# Patient Record
Sex: FEMALE | Race: WHITE | NOT HISPANIC OR LATINO | Employment: UNEMPLOYED | RURAL
[De-identification: names, ages, dates, MRNs, and addresses within clinical notes are randomized per-mention and may not be internally consistent; named-entity substitution may affect disease eponyms.]

---

## 2022-01-24 ENCOUNTER — APPOINTMENT (OUTPATIENT)
Dept: LAB | Facility: HOSPITAL | Age: 67
End: 2022-01-24
Attending: NURSE PRACTITIONER
Payer: OTHER GOVERNMENT

## 2022-01-24 DIAGNOSIS — Z20.828 EXPOSURE TO SARS-ASSOCIATED CORONAVIRUS: Primary | ICD-10-CM

## 2022-01-24 LAB — SARS-COV+SARS-COV-2 AG RESP QL IA.RAPID: NEGATIVE

## 2022-01-24 PROCEDURE — 87426 SARSCOV CORONAVIRUS AG IA: CPT

## 2022-01-26 ENCOUNTER — HOSPITAL ENCOUNTER (EMERGENCY)
Facility: HOSPITAL | Age: 67
Discharge: HOME | End: 2022-01-26
Payer: MEDICARE

## 2022-01-26 VITALS
DIASTOLIC BLOOD PRESSURE: 72 MMHG | RESPIRATION RATE: 16 BRPM | BODY MASS INDEX: 23 KG/M2 | TEMPERATURE: 98 F | HEART RATE: 104 BPM | SYSTOLIC BLOOD PRESSURE: 143 MMHG | HEIGHT: 62 IN | OXYGEN SATURATION: 98 % | WEIGHT: 125 LBS

## 2022-01-26 DIAGNOSIS — R53.1 WEAKNESS: ICD-10-CM

## 2022-01-26 DIAGNOSIS — E11.65 HYPERGLYCEMIA DUE TO DIABETES MELLITUS: ICD-10-CM

## 2022-01-26 DIAGNOSIS — R06.02 SOB (SHORTNESS OF BREATH): Primary | ICD-10-CM

## 2022-01-26 DIAGNOSIS — Z91.148 NON COMPLIANCE W MEDICATION REGIMEN: ICD-10-CM

## 2022-01-26 LAB
ALBUMIN SERPL BCP-MCNC: 4 G/DL (ref 3.5–5)
ALBUMIN/GLOB SERPL: 1.1 {RATIO}
ALP SERPL-CCNC: 111 U/L (ref 50–130)
ALT SERPL W P-5'-P-CCNC: 17 U/L (ref 13–56)
ANION GAP SERPL CALCULATED.3IONS-SCNC: 20 MMOL/L (ref 7–16)
AST SERPL W P-5'-P-CCNC: 13 U/L (ref 15–37)
BILIRUB SERPL-MCNC: 0.5 MG/DL (ref 0–1.2)
BUN SERPL-MCNC: 28 MG/DL (ref 7–18)
BUN/CREAT SERPL: 25 (ref 6–20)
CALCIUM SERPL-MCNC: 9.8 MG/DL (ref 8.5–10.1)
CHLORIDE SERPL-SCNC: 97 MMOL/L (ref 98–107)
CO2 SERPL-SCNC: 21 MMOL/L (ref 21–32)
CREAT SERPL-MCNC: 1.1 MG/DL (ref 0.55–1.02)
FLUAV AG UPPER RESP QL IA.RAPID: NEGATIVE
FLUBV AG UPPER RESP QL IA.RAPID: NEGATIVE
GLOBULIN SER-MCNC: 3.8 G/DL (ref 2–4)
GLUCOSE SERPL-MCNC: 181 MG/DL (ref 70–105)
GLUCOSE SERPL-MCNC: 368 MG/DL (ref 70–105)
GLUCOSE SERPL-MCNC: 409 MG/DL (ref 74–106)
LACTATE SERPL-SCNC: 2.3 MMOL/L (ref 0.4–2)
MAGNESIUM SERPL-MCNC: 2.1 MG/DL (ref 1.7–2.3)
POTASSIUM SERPL-SCNC: 3.8 MMOL/L (ref 3.5–5.1)
PROT SERPL-MCNC: 7.8 G/DL (ref 6.4–8.2)
SARS-COV+SARS-COV-2 AG RESP QL IA.RAPID: NEGATIVE
SODIUM SERPL-SCNC: 134 MMOL/L (ref 136–145)

## 2022-01-26 PROCEDURE — 93010 EKG 12-LEAD: ICD-10-PCS | Mod: ,,, | Performed by: INTERNAL MEDICINE

## 2022-01-26 PROCEDURE — 87428 SARSCOV & INF VIR A&B AG IA: CPT | Performed by: SPECIALIST

## 2022-01-26 PROCEDURE — 80053 COMPREHEN METABOLIC PANEL: CPT | Performed by: SPECIALIST

## 2022-01-26 PROCEDURE — 96374 THER/PROPH/DIAG INJ IV PUSH: CPT

## 2022-01-26 PROCEDURE — 82962 GLUCOSE BLOOD TEST: CPT | Mod: 91

## 2022-01-26 PROCEDURE — 93005 ELECTROCARDIOGRAM TRACING: CPT

## 2022-01-26 PROCEDURE — 83735 ASSAY OF MAGNESIUM: CPT | Performed by: SPECIALIST

## 2022-01-26 PROCEDURE — 36415 COLL VENOUS BLD VENIPUNCTURE: CPT | Performed by: SPECIALIST

## 2022-01-26 PROCEDURE — 83605 ASSAY OF LACTIC ACID: CPT | Performed by: SPECIALIST

## 2022-01-26 PROCEDURE — 99283 EMERGENCY DEPT VISIT LOW MDM: CPT | Performed by: SPECIALIST

## 2022-01-26 PROCEDURE — 63600175 PHARM REV CODE 636 W HCPCS: Performed by: SPECIALIST

## 2022-01-26 PROCEDURE — 93010 ELECTROCARDIOGRAM REPORT: CPT | Mod: ,,, | Performed by: INTERNAL MEDICINE

## 2022-01-26 PROCEDURE — 25000003 PHARM REV CODE 250: Performed by: SPECIALIST

## 2022-01-26 PROCEDURE — 96361 HYDRATE IV INFUSION ADD-ON: CPT

## 2022-01-26 PROCEDURE — 99284 EMERGENCY DEPT VISIT MOD MDM: CPT | Mod: 25

## 2022-01-26 RX ORDER — INSULIN GLARGINE 300 U/ML
25 INJECTION, SOLUTION SUBCUTANEOUS DAILY
COMMUNITY

## 2022-01-26 RX ORDER — GABAPENTIN 300 MG/1
300 CAPSULE ORAL 2 TIMES DAILY
COMMUNITY

## 2022-01-26 RX ORDER — GLIPIZIDE 10 MG/1
10 TABLET, FILM COATED, EXTENDED RELEASE ORAL 2 TIMES DAILY WITH MEALS
COMMUNITY
End: 2022-06-10 | Stop reason: CLARIF

## 2022-01-26 RX ORDER — METFORMIN HYDROCHLORIDE 850 MG/1
850 TABLET ORAL 2 TIMES DAILY WITH MEALS
COMMUNITY

## 2022-01-26 RX ADMIN — HUMAN INSULIN 15 UNITS: 100 INJECTION, SOLUTION SUBCUTANEOUS at 02:01

## 2022-01-26 RX ADMIN — SODIUM CHLORIDE 1000 ML: 0.9 INJECTION, SOLUTION INTRAVENOUS at 02:01

## 2022-01-26 NOTE — ED NOTES
PATIENT STATES SHE DOES NOT TAKE HER INSULIN AS ORDERED BECAUSE SHE DOESN'T WANT THE INSULIN TO RUN OUT; SHE DOUBLES UP ON HER GLIPIZIDE WHEN SHE DOESN'T TAKE HER INSULIN

## 2022-01-26 NOTE — ED TRIAGE NOTES
PATIENT WAS IN ER YESTERDAY & RECEIVED COVID TEST WHICH CAME BACK NEGATIVE;    DAUGHTER CALLED [facility] THIS MORNING WHEN SHE WAS INFORMED THE TEST MUST HAVE BEEN INCORRECT    PATIENT IS EXHIBITING NO S/S OF DISCOMFORT AT THIS TIME

## 2022-01-26 NOTE — DISCHARGE INSTRUCTIONS
Follow up with the [facility] Dr Mcgraw  You will need to follow your diabetic regimen correctly to ensure blood sugars are taken care of correctly

## 2022-01-26 NOTE — ED PROVIDER NOTES
Encounter Date: 1/26/2022       History     Chief Complaint   Patient presents with    Shortness of Breath     sob     Patient is a 65 yo white female who is complaining of shortness of breath.  She had a COVID test which was negative.  Patient appears very fatigued.  She has issues with her insulin running out too soon so she will double up on Glipizide.  Patient is non compliant with medications.  She ambulated into the ER without difficulty and states that she is very weak.          Review of patient's allergies indicates:  No Known Allergies  Past Medical History:   Diagnosis Date    Arthritis     Diabetes mellitus     Lupus      Past Surgical History:   Procedure Laterality Date    TUBLIGATION       History reviewed. No pertinent family history.  Social History     Tobacco Use    Smoking status: Never Smoker    Smokeless tobacco: Never Used   Substance Use Topics    Alcohol use: Never    Drug use: Never     Review of Systems   Constitutional: Positive for activity change and fatigue.   HENT: Negative.    Eyes: Negative.    Respiratory: Positive for shortness of breath.    Cardiovascular: Negative.    Gastrointestinal: Negative.    Endocrine: Negative.    Genitourinary: Negative.    Musculoskeletal: Negative.    Neurological: Positive for weakness and light-headedness.   Psychiatric/Behavioral: Negative.        Physical Exam     Initial Vitals [01/26/22 1333]   BP Pulse Resp Temp SpO2   (!) 143/72 104 16 97.8 °F (36.6 °C) 98 %      MAP       --         Physical Exam    Constitutional: She appears well-developed and well-nourished. No distress.   Depressed affect   HENT:   Head: Normocephalic and atraumatic.   Eyes: Pupils are equal, round, and reactive to light.   Neck: Neck supple.   Normal range of motion.  Cardiovascular: Exam reveals no friction rub.    No murmur heard.  tachycardia   Pulmonary/Chest: Breath sounds normal.   Abdominal: Abdomen is soft.   Musculoskeletal:         General: Normal  range of motion.      Cervical back: Normal range of motion and neck supple.     Neurological: She is alert and oriented to person, place, and time. She has normal strength.   Skin: Skin is warm and dry.   Psychiatric: Thought content normal.         Medical Screening Exam   See Full Note    ED Course   Procedures  Labs Reviewed   COMPREHENSIVE METABOLIC PANEL - Abnormal; Notable for the following components:       Result Value    Sodium 134 (*)     Chloride 97 (*)     Anion Gap 20 (*)     Glucose 409 (*)     BUN 28 (*)     Creatinine 1.10 (*)     BUN/Creatinine Ratio 25 (*)     AST 13 (*)     eGFR 53 (*)     All other components within normal limits   LACTIC ACID, PLASMA - Abnormal; Notable for the following components:    Lactic Acid 2.3 (*)     All other components within normal limits   POCT GLUCOSE MONITORING CONTINUOUS - Abnormal; Notable for the following components:    POC Glucose 368 (*)     All other components within normal limits   POCT GLUCOSE MONITORING CONTINUOUS - Abnormal; Notable for the following components:    POC Glucose 181 (*)     All other components within normal limits   MAGNESIUM - Normal   SARS-COV2 (COVID) W/ FLU ANTIGEN - Normal    Narrative:     Negative SARS-CoV results should not be used as the sole basis for treatment or patient management decisions; negative results should be considered in the context of a patient's recent exposures, history and the presene of clinical signs and symptoms consistent with COVID-19.  Negative results should be treated as presumptive and confirmed by molecular assay, if necessary for patient management.   CBC W/ AUTO DIFFERENTIAL    Narrative:     The following orders were created for panel order CBC auto differential.  Procedure                               Abnormality         Status                     ---------                               -----------         ------                     CBC with Differential[[id number]]                                                          Please view results for these tests on the individual orders.   CBC WITH DIFFERENTIAL        ECG Results          EKG 12-lead (In process)  Result time 01/26/22 14:20:58    In process by Interface, Lab In [facility] (01/26/22 14:20:58)                 Narrative:    Test Reason : R53.1,    Vent. Rate : 099 BPM     Atrial Rate : 099 BPM     P-R Int : 130 ms          QRS Dur : 084 ms      QT Int : 350 ms       P-R-T Axes : -03 070 070 degrees     QTc Int : 449 ms    Normal sinus rhythm  Normal ECG  No previous ECGs available    Referred By: [email or web address]   SELF           Confirmed By:                             Imaging Results    None          Medications   sodium chloride 0.9% bolus 1,000 mL (0 mLs Intravenous Stopped 1/26/22 1502)   insulin regular injection 15 Units (15 Units Intravenous Given 1/26/22 1427)                       Clinical Impression:   Final diagnoses:  [R53.1] Weakness  [R06.02] SOB (shortness of breath) (Primary)  [E11.65] Hyperglycemia due to diabetes mellitus  [Z91.14] Non compliance w medication regimen          ED Disposition Condition    Discharge Stable        ED Prescriptions     None        Follow-up Information     Follow up With Specialties Details Why Contact Info    Marisela Peguero MD Nurse Practitioner   [facility] [address] [phone]             Mansi Marquez MD  01/26/22 4968

## 2022-04-20 ENCOUNTER — HOSPITAL ENCOUNTER (EMERGENCY)
Facility: HOSPITAL | Age: 67
Discharge: HOME | End: 2022-04-20
Attending: EMERGENCY MEDICINE
Payer: MEDICARE

## 2022-04-20 VITALS
OXYGEN SATURATION: 99 % | SYSTOLIC BLOOD PRESSURE: 151 MMHG | DIASTOLIC BLOOD PRESSURE: 76 MMHG | HEART RATE: 89 BPM | TEMPERATURE: 99 F | WEIGHT: 111 LBS | RESPIRATION RATE: 18 BRPM | BODY MASS INDEX: 20.43 KG/M2 | HEIGHT: 62 IN

## 2022-04-20 DIAGNOSIS — R07.9 CHEST PAIN: ICD-10-CM

## 2022-04-20 DIAGNOSIS — I48.91 ATRIAL FIBRILLATION WITH RVR: Primary | ICD-10-CM

## 2022-04-20 DIAGNOSIS — E83.42 HYPOMAGNESEMIA: ICD-10-CM

## 2022-04-20 LAB
ALBUMIN SERPL BCP-MCNC: 4 G/DL (ref 3.5–5)
ALBUMIN/GLOB SERPL: 1.3 {RATIO}
ALP SERPL-CCNC: 114 U/L (ref 55–142)
ALT SERPL W P-5'-P-CCNC: 14 U/L (ref 13–56)
ANION GAP SERPL CALCULATED.3IONS-SCNC: 10 MMOL/L (ref 7–16)
AST SERPL W P-5'-P-CCNC: 13 U/L (ref 15–37)
BASOPHILS # BLD AUTO: 0.02 K/UL (ref 0–0.2)
BASOPHILS NFR BLD AUTO: 0.2 % (ref 0–1)
BILIRUB SERPL-MCNC: 0.7 MG/DL (ref 0–1.2)
BUN SERPL-MCNC: 9 MG/DL (ref 7–18)
BUN/CREAT SERPL: 14 (ref 6–20)
CALCIUM SERPL-MCNC: 9 MG/DL (ref 8.5–10.1)
CHLORIDE SERPL-SCNC: 102 MMOL/L (ref 98–107)
CO2 SERPL-SCNC: 29 MMOL/L (ref 21–32)
CREAT SERPL-MCNC: 0.66 MG/DL (ref 0.55–1.02)
DIFFERENTIAL METHOD BLD: ABNORMAL
EOSINOPHIL # BLD AUTO: 0.14 K/UL (ref 0–0.5)
EOSINOPHIL NFR BLD AUTO: 1.7 % (ref 1–4)
ERYTHROCYTE [DISTWIDTH] IN BLOOD BY AUTOMATED COUNT: 12.8 % (ref 11.5–14.5)
GLOBULIN SER-MCNC: 3.1 G/DL (ref 2–4)
GLUCOSE SERPL-MCNC: 244 MG/DL (ref 70–105)
GLUCOSE SERPL-MCNC: 246 MG/DL (ref 74–106)
HCT VFR BLD AUTO: 39.3 % (ref 38–47)
HGB BLD-MCNC: 12.9 G/DL (ref 12–16)
IMM GRANULOCYTES # BLD AUTO: 0.01 K/UL (ref 0–0.04)
IMM GRANULOCYTES NFR BLD: 0.1 % (ref 0–0.4)
INR BLD: 1.02 (ref 0.9–1.1)
LYMPHOCYTES # BLD AUTO: 1.2 K/UL (ref 1–4.8)
LYMPHOCYTES NFR BLD AUTO: 14.9 % (ref 27–41)
MAGNESIUM SERPL-MCNC: 1.6 MG/DL (ref 1.7–2.3)
MCH RBC QN AUTO: 26.9 PG (ref 27–31)
MCHC RBC AUTO-ENTMCNC: 32.8 G/DL (ref 32–36)
MCV RBC AUTO: 81.9 FL (ref 80–96)
MONOCYTES # BLD AUTO: 0.35 K/UL (ref 0–0.8)
MONOCYTES NFR BLD AUTO: 4.4 % (ref 2–6)
MPC BLD CALC-MCNC: 9.5 FL (ref 9.4–12.4)
NEUTROPHILS # BLD AUTO: 6.31 K/UL (ref 1.8–7.7)
NEUTROPHILS NFR BLD AUTO: 78.7 % (ref 53–65)
PLATELET # BLD AUTO: 324 K/UL (ref 150–400)
POTASSIUM SERPL-SCNC: 3.8 MMOL/L (ref 3.5–5.1)
PROT SERPL-MCNC: 7.1 G/DL (ref 6.4–8.2)
PROTHROMBIN TIME: 13.4 SECONDS (ref 11.7–14.7)
RBC # BLD AUTO: 4.8 M/UL (ref 4.2–5.4)
SODIUM SERPL-SCNC: 137 MMOL/L (ref 136–145)
TROPONIN I SERPL HS-MCNC: 9.3 PG/ML
WBC # BLD AUTO: 8.03 K/UL (ref 4.5–11)

## 2022-04-20 PROCEDURE — 63600175 PHARM REV CODE 636 W HCPCS: Performed by: EMERGENCY MEDICINE

## 2022-04-20 PROCEDURE — 25000003 PHARM REV CODE 250: Performed by: EMERGENCY MEDICINE

## 2022-04-20 PROCEDURE — 80053 COMPREHEN METABOLIC PANEL: CPT | Performed by: EMERGENCY MEDICINE

## 2022-04-20 PROCEDURE — 99285 EMERGENCY DEPT VISIT HI MDM: CPT | Mod: 25

## 2022-04-20 PROCEDURE — 84484 ASSAY OF TROPONIN QUANT: CPT | Performed by: EMERGENCY MEDICINE

## 2022-04-20 PROCEDURE — 82962 GLUCOSE BLOOD TEST: CPT

## 2022-04-20 PROCEDURE — 36415 COLL VENOUS BLD VENIPUNCTURE: CPT | Performed by: EMERGENCY MEDICINE

## 2022-04-20 PROCEDURE — 82040 ASSAY OF SERUM ALBUMIN: CPT | Performed by: EMERGENCY MEDICINE

## 2022-04-20 PROCEDURE — 99284 EMERGENCY DEPT VISIT MOD MDM: CPT | Performed by: EMERGENCY MEDICINE

## 2022-04-20 PROCEDURE — 83735 ASSAY OF MAGNESIUM: CPT | Performed by: EMERGENCY MEDICINE

## 2022-04-20 PROCEDURE — 96361 HYDRATE IV INFUSION ADD-ON: CPT | Mod: GZ

## 2022-04-20 PROCEDURE — 85610 PROTHROMBIN TIME: CPT | Performed by: EMERGENCY MEDICINE

## 2022-04-20 PROCEDURE — 93010 ELECTROCARDIOGRAM REPORT: CPT | Mod: ,,, | Performed by: EMERGENCY MEDICINE

## 2022-04-20 PROCEDURE — 93010 EKG 12-LEAD: ICD-10-PCS | Mod: ,,, | Performed by: EMERGENCY MEDICINE

## 2022-04-20 PROCEDURE — 85025 COMPLETE CBC W/AUTO DIFF WBC: CPT | Performed by: EMERGENCY MEDICINE

## 2022-04-20 PROCEDURE — 93005 ELECTROCARDIOGRAM TRACING: CPT

## 2022-04-20 PROCEDURE — 96365 THER/PROPH/DIAG IV INF INIT: CPT

## 2022-04-20 RX ORDER — NAPROXEN SODIUM 220 MG/1
324 TABLET, FILM COATED ORAL
Status: COMPLETED | OUTPATIENT
Start: 2022-04-20 | End: 2022-04-20

## 2022-04-20 RX ORDER — MAGNESIUM SULFATE HEPTAHYDRATE 40 MG/ML
2 INJECTION, SOLUTION INTRAVENOUS
Status: DISCONTINUED | OUTPATIENT
Start: 2022-04-20 | End: 2022-04-20

## 2022-04-20 RX ORDER — LANOLIN ALCOHOL/MO/W.PET/CERES
400 CREAM (GRAM) TOPICAL DAILY
Qty: 30 TABLET | Refills: 0 | Status: SHIPPED | OUTPATIENT
Start: 2022-04-20 | End: 2022-05-20

## 2022-04-20 RX ORDER — PRAVASTATIN SODIUM 40 MG/1
40 TABLET ORAL NIGHTLY
COMMUNITY
Start: 2022-03-07

## 2022-04-20 RX ORDER — METOPROLOL SUCCINATE 25 MG/1
12.5 TABLET, EXTENDED RELEASE ORAL DAILY
COMMUNITY
Start: 2022-04-05 | End: 2022-04-20 | Stop reason: SDUPTHER

## 2022-04-20 RX ORDER — GLIPIZIDE 10 MG/1
10 TABLET ORAL 2 TIMES DAILY
COMMUNITY
Start: 2021-12-21

## 2022-04-20 RX ORDER — INSULIN GLARGINE 300 U/ML
INJECTION, SOLUTION SUBCUTANEOUS
COMMUNITY
Start: 2021-12-09

## 2022-04-20 RX ORDER — MAGNESIUM SULFATE HEPTAHYDRATE 40 MG/ML
2 INJECTION, SOLUTION INTRAVENOUS
Status: DISCONTINUED | OUTPATIENT
Start: 2022-04-20 | End: 2022-04-20 | Stop reason: HOSPADM

## 2022-04-20 RX ORDER — HYDROCODONE BITARTRATE AND ACETAMINOPHEN 7.5; 325 MG/1; MG/1
TABLET ORAL
COMMUNITY
Start: 2022-04-05 | End: 2022-06-10 | Stop reason: CLARIF

## 2022-04-20 RX ORDER — METOPROLOL SUCCINATE 25 MG/1
25 TABLET, EXTENDED RELEASE ORAL DAILY
Qty: 30 TABLET | Refills: 0 | Status: SHIPPED | OUTPATIENT
Start: 2022-04-20 | End: 2022-05-20

## 2022-04-20 RX ADMIN — SODIUM CHLORIDE 1000 ML: 9 INJECTION, SOLUTION INTRAVENOUS at 10:04

## 2022-04-20 RX ADMIN — MAGNESIUM SULFATE HEPTAHYDRATE 2 G: 40 INJECTION, SOLUTION INTRAVENOUS at 11:04

## 2022-04-20 RX ADMIN — ASPIRIN 81 MG CHEWABLE TABLET 324 MG: 81 TABLET CHEWABLE at 09:04

## 2022-04-20 NOTE — ED NOTES
Call placed to Dr Winters at [facility] [place]-[phone]-spoke with Jena Patton-she will speak with Dr Winters for return call

## 2022-04-20 NOTE — ED PROVIDER NOTES
Encounter Date: 4/20/2022       History     Chief Complaint   Patient presents with    Chest Pain    Palpitations     Patient presents from Dr. Granados saw office with report of intermittent  vague chest pains through the night , and noted to be in atrial fibrillation with rapid ventricular response with a rate of 143 in the office this morning and was therefore sent to the emergency department.  On arrival here she was in a sinus tachycardia with a rate of 102. No chest pain at this time.  She saw a cardiologist yesterday and mobile and is scheduled for some further testing.  No previous history of atrial fibrillation with RVR.  She has a history of diabetes.  She takes metoprolol extended release 25 mg, half a tablet daily for blood pressure treatment.  She had previously been on an ACE-inhibitor but that was changed 3 weeks ago due to a chronic cough which has persisted.        Review of patient's allergies indicates:  No Known Allergies  Past Medical History:   Diagnosis Date    Arthritis     Diabetes mellitus     Hypertension     Lupus      Past Surgical History:   Procedure Laterality Date    TUBLIGATION       History reviewed. No pertinent family history.  Social History     Tobacco Use    Smoking status: Never Smoker    Smokeless tobacco: Never Used   Substance Use Topics    Alcohol use: Never    Drug use: Never     Review of Systems   Constitutional: Negative.    HENT: Negative.    Eyes: Negative.    Respiratory: Negative.  Negative for chest tightness and shortness of breath.    Cardiovascular: Positive for chest pain. Negative for palpitations and leg swelling.   Gastrointestinal: Negative.    Genitourinary: Negative.    Musculoskeletal: Negative.    Skin: Negative.    Neurological: Negative.    Hematological: Negative.    Psychiatric/Behavioral: Negative.    All other systems reviewed and are negative.      Physical Exam     Initial Vitals [04/20/22 0856]   BP Pulse Resp Temp SpO2   (!) 153/83  104 17 98.7 °F (37.1 °C) 99 %      MAP       --         Physical Exam    Nursing note and vitals reviewed.  Constitutional: She appears well-developed and well-nourished. She is not diaphoretic. No distress.   HENT:   Head: Normocephalic.   Right Ear: External ear normal.   Left Ear: External ear normal.   Nose: Nose normal.   Mouth/Throat: Oropharynx is clear and moist. No oropharyngeal exudate.   Eyes: Conjunctivae and EOM are normal. Pupils are equal, round, and reactive to light. Right eye exhibits no discharge. Left eye exhibits no discharge. No scleral icterus.   Neck: Neck supple. No tracheal deviation present. No JVD present.   Normal range of motion.  Cardiovascular: Normal rate, regular rhythm, normal heart sounds and intact distal pulses.   No murmur heard.  Rate 96 during my examination.   Pulmonary/Chest: Breath sounds normal. No stridor. No respiratory distress. She has no wheezes. She has no rhonchi. She has no rales. She exhibits no tenderness.   Abdominal: Abdomen is soft. Bowel sounds are normal. She exhibits no distension. There is no abdominal tenderness. There is no rebound and no guarding.   Musculoskeletal:         General: No tenderness or edema. Normal range of motion.      Cervical back: Normal range of motion and neck supple.     Lymphadenopathy:     She has no cervical adenopathy.   Neurological: She is alert and oriented to person, place, and time. She has normal strength. No cranial nerve deficit or sensory deficit. GCS score is 15. GCS eye subscore is 4. GCS verbal subscore is 5. GCS motor subscore is 6.   Skin: Skin is warm and dry. Capillary refill takes less than 2 seconds. No rash noted. No erythema. No pallor.   Psychiatric: She has a normal mood and affect. Her behavior is normal.         Medical Screening Exam   See Full Note    ED Course   Procedures  Labs Reviewed   COMPREHENSIVE METABOLIC PANEL - Abnormal; Notable for the following components:       Result Value    Glucose  246 (*)     AST 13 (*)     All other components within normal limits   CBC WITH DIFFERENTIAL - Abnormal; Notable for the following components:    MCH 26.9 (*)     Neutrophils % 78.7 (*)     Lymphocytes % 14.9 (*)     All other components within normal limits   MAGNESIUM - Abnormal; Notable for the following components:    Magnesium 1.6 (*)     All other components within normal limits   POCT GLUCOSE MONITORING CONTINUOUS - Abnormal; Notable for the following components:    POC Glucose 244 (*)     All other components within normal limits   TROPONIN I - Normal   PROTIME-INR - Normal   CBC W/ AUTO DIFFERENTIAL    Narrative:     The following orders were created for panel order CBC auto differential.  Procedure                               Abnormality         Status                     ---------                               -----------         ------                     CBC with Differential[[id number]]        Abnormal            Final result                 Please view results for these tests on the individual orders.        ECG Results          EKG 12-lead (In process)  Result time 04/20/22 09:19:21    In process by Interface, Lab In [facility] (04/20/22 09:19:21)                 Narrative:    Test Reason : R07.9,    Vent. Rate : 102 BPM     Atrial Rate : 102 BPM     P-R Int : 160 ms          QRS Dur : 070 ms      QT Int : 340 ms       P-R-T Axes : 075 066 076 degrees     QTc Int : 443 ms    Sinus tachycardia  Otherwise normal ECG  When compared with ECG of 26-JAN-2022 14:09,  No significant change was found    Referred By: [email or web address]   SELF           Confirmed By:                             Imaging Results          X-Ray Chest PA And Lateral (Final result)  Result time 04/20/22 09:30:16    Final result by Andre Hoffman DO (04/20/22 09:30:16)                 Impression:      No acute cardiopulmonary process demonstrated.    Point of Service: [facility]      Electronically signed by: Andre  Dalton  Date:    04/20/2022  Time:    09:30             Narrative:    EXAMINATION:  XR CHEST PA AND LATERAL    CLINICAL HISTORY:  Chest Pain;    COMPARISON:  None    TECHNIQUE:  Frontal and lateral views of the chest.    FINDINGS:  Heart size appears within normal limits.  Chronic change of the lungs without focal consolidation, pleural effusion, or pneumothorax.  Visualized osseous and surrounding soft tissue structures demonstrate no acute abnormality.                                 Medications   magnesium sulfate 2g in water 50mL IVPB (premix) (0 g Intravenous Stopped 4/20/22 1225)   aspirin chewable tablet 324 mg (324 mg Oral Given 4/20/22 0916)   sodium chloride 0.9% bolus 1,000 mL (0 mLs Intravenous Stopped 4/20/22 1122)     Medical Decision Making:   Independently Interpreted Test(s):   I have ordered and independently interpreted X-rays - see summary below.       <> Summary of X-Ray Reading(s):   PA and lateral chest x-ray shows no acute process with clear lung fields noted and normal heart size.  Clinical Tests:   Lab Tests: Reviewed  Radiological Study: Reviewed  ED Management:   Patient was given IV fluids as well as IV magnesium for and low magnesium level.  Remains in sinus rhythm.    Radiologist's report for PA and lateral chest x-ray indicates no acute process.                 Clinical Impression:   Final diagnoses:  [R07.9] Chest pain  [I48.91] Atrial fibrillation with RVR (Primary)  [E83.42] Hypomagnesemia                 Todd Cadena DO  04/20/22 1235

## 2022-04-20 NOTE — ED TRIAGE NOTES
"Sent from dr siddiqui office for c/o intermittent chest pain with palpations which pt states has been going on for "a while". States that she did have some nausea ealrlier but denies sob. Dr. siddiqui nurse called and said ekg done in office showed afib with rvr. Pt in nsr at this time. Pt is diabetic, takes insulin and oral meds. fsbs 244 and pt states she hasnt had anything to eat or drink this morning.  "

## 2022-04-23 ENCOUNTER — TELEPHONE (OUTPATIENT)
Dept: EMERGENCY MEDICINE | Facility: HOSPITAL | Age: 67
End: 2022-04-23
Payer: MEDICARE

## 2022-06-10 ENCOUNTER — HOSPITAL ENCOUNTER (EMERGENCY)
Facility: HOSPITAL | Age: 67
Discharge: HOME | End: 2022-06-11
Attending: SURGERY
Payer: MEDICARE
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
## 2022-06-10 DIAGNOSIS — S42.201A CLOSED FRACTURE OF PROXIMAL END OF RIGHT HUMERUS, UNSPECIFIED FRACTURE MORPHOLOGY, INITIAL ENCOUNTER: ICD-10-CM
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
## 2022-06-10 LAB — GLUCOSE SERPL-MCNC: 297 MG/DL (ref 70–105)

## 2022-06-10 PROCEDURE — 63600175 PHARM REV CODE 636 W HCPCS: Performed by: SURGERY

## 2022-06-10 PROCEDURE — 99284 EMERGENCY DEPT VISIT MOD MDM: CPT

## 2022-06-10 PROCEDURE — 99284 EMERGENCY DEPT VISIT MOD MDM: CPT | Performed by: SURGERY

## 2022-06-10 PROCEDURE — 96372 THER/PROPH/DIAG INJ SC/IM: CPT

## 2022-06-10 PROCEDURE — 82962 GLUCOSE BLOOD TEST: CPT

## 2022-06-10 RX ORDER — PANTOPRAZOLE SODIUM 40 MG/1
40 TABLET, DELAYED RELEASE ORAL DAILY
COMMUNITY
Start: 2022-05-27

## 2022-06-10 RX ORDER — GABAPENTIN 600 MG/1
600 TABLET ORAL 3 TIMES DAILY
COMMUNITY
Start: 2022-06-03

## 2022-06-10 RX ORDER — FERROUS SULFATE TAB 325 MG (65 MG ELEMENTAL FE) 325 (65 FE) MG
TAB ORAL 2 TIMES DAILY
COMMUNITY
Start: 2022-05-27

## 2022-06-10 RX ORDER — BENZONATATE 100 MG/1
100 CAPSULE ORAL 3 TIMES DAILY PRN
COMMUNITY
Start: 2022-05-27

## 2022-06-10 RX ORDER — HYDROCODONE BITARTRATE AND ACETAMINOPHEN 10; 325 MG/1; MG/1
1 TABLET ORAL 3 TIMES DAILY PRN
COMMUNITY
Start: 2022-05-31

## 2022-06-10 RX ORDER — ALBUTEROL SULFATE 0.83 MG/ML
SOLUTION RESPIRATORY (INHALATION)
COMMUNITY
Start: 2022-05-19

## 2022-06-10 RX ORDER — MORPHINE SULFATE 2 MG/ML
2 INJECTION, SOLUTION INTRAMUSCULAR; INTRAVENOUS
Status: COMPLETED | OUTPATIENT
Start: 2022-06-10 | End: 2022-06-10

## 2022-06-10 RX ORDER — SOTALOL HYDROCHLORIDE 80 MG/1
80 TABLET ORAL 2 TIMES DAILY
COMMUNITY
Start: 2022-05-27

## 2022-06-10 RX ORDER — MORPHINE SULFATE 2 MG/ML
2 INJECTION, SOLUTION INTRAMUSCULAR; INTRAVENOUS
Status: DISCONTINUED | OUTPATIENT
Start: 2022-06-10 | End: 2022-06-10

## 2022-06-10 RX ADMIN — MORPHINE SULFATE 2 MG: 2 INJECTION, SOLUTION INTRAMUSCULAR; INTRAVENOUS at 11:06

## 2022-06-11 VITALS
OXYGEN SATURATION: 97 % | HEIGHT: 62 IN | DIASTOLIC BLOOD PRESSURE: 78 MMHG | SYSTOLIC BLOOD PRESSURE: 142 MMHG | TEMPERATURE: 98 F | WEIGHT: 104 LBS | HEART RATE: 64 BPM | RESPIRATION RATE: 18 BRPM | BODY MASS INDEX: 19.14 KG/M2

## 2022-06-11 RX ORDER — HYDROCODONE BITARTRATE AND ACETAMINOPHEN 10; 325 MG/1; MG/1
1 TABLET ORAL EVERY 4 HOURS PRN
Qty: 30 TABLET | Refills: 0 | Status: SHIPPED | OUTPATIENT
Start: 2022-06-11

## 2022-06-11 NOTE — ED PROVIDER NOTES
Encounter Date: 6/10/2022       History     Chief Complaint   Patient presents with    Shoulder Injury     Patient is a 67-year-old female who presents after a fall from steps, reportedly missed the step, lost her balance, landed on her right shoulder.  She has significant pain of the right shoulder, unable to move it.  Denies any other injuries, denies any loss of consciousness.  Patient was recently diagnosed with lung cancer.  She has a history of arthritis, diabetes, hypertension, and lupus.        Review of patient's allergies indicates:  No Known Allergies  Past Medical History:   Diagnosis Date    Arthritis     Diabetes mellitus     Hypertension     Lung cancer     Lupus      Past Surgical History:   Procedure Laterality Date    BACK SURGERY      TUBLIGATION       History reviewed. No pertinent family history.  Social History     Tobacco Use    Smoking status: Never Smoker    Smokeless tobacco: Never Used   Substance Use Topics    Alcohol use: Never    Drug use: Never     Review of Systems   Musculoskeletal: Positive for joint swelling.   All other systems reviewed and are negative.      Physical Exam     Initial Vitals [06/10/22 2300]   BP Pulse Resp Temp SpO2   (!) 177/81 80 20 97.7 °F (36.5 °C) 99 %      MAP       --         Physical Exam    Constitutional: She appears well-developed and well-nourished. No distress.   HENT:   Head: Normocephalic and atraumatic.   Eyes: EOM are normal. Pupils are equal, round, and reactive to light.   Neck: Neck supple.   Normal range of motion.  Cardiovascular: Normal rate, regular rhythm and normal heart sounds.   Pulmonary/Chest: No respiratory distress. She has no wheezes.   Abdominal: Abdomen is soft. Bowel sounds are normal. She exhibits no distension. There is no abdominal tenderness.   Musculoskeletal:         General: Tenderness and edema present.      Right shoulder: Tenderness and bony tenderness present. Decreased range of motion.      Left shoulder:  Normal.      Cervical back: Normal range of motion and neck supple.     Neurological: She is alert and oriented to person, place, and time.   Skin: Skin is warm and dry.         Medical Screening Exam   See Full Note    ED Course   Procedures  Labs Reviewed   POCT GLUCOSE MONITORING CONTINUOUS - Abnormal; Notable for the following components:       Result Value    POC Glucose 297 (*)     All other components within normal limits          Imaging Results          X-Ray Shoulder Trauma Right (Preliminary result)  Result time 06/10/22 23:20:42    ED Interpretation by Peter Fine MD (06/10/22 23:20:42, [facility] Emergency Department, Emergency Medicine)    Fracture of the humeral head                               Medications   morphine injection 2 mg (2 mg Intramuscular Given 6/10/22 0048)     Medical Decision Making:   Clinical Tests:   Radiological Study: Ordered and Reviewed  ED Management:  Proximal humerus fracture on X-Ray.  Discussed with Dr. Valdovinos with Ortho who is on call.  Will have her F/U in his office on Monday.  Place in shoulder immobilizer tonight.  Rx for pain meds provided.                    Clinical Impression:   Final diagnoses:  [W10.8XXA] Fall (on) (from) other stairs and steps, initial encounter  [W19.XXXA] Fall, initial encounter (Primary)  [S42.201A] Closed fracture of proximal end of right humerus, unspecified fracture morphology, initial encounter          ED Disposition Condition    Discharge Stable        ED Prescriptions     Medication Sig Dispense Start Date End Date Auth. Provider    HYDROcodone-acetaminophen (NORCO)  mg per tablet Take 1 tablet by mouth every 4 (four) hours as needed for Pain. 30 tablet 6/11/2022  Peter Fine MD        Follow-up Information     Follow up With Specialties Details Why Contact Info    Temo Valdovinos MD Orthopedic Surgery On 6/13/2022  [address]   [address]  [facility] [place] [address]  [phone]              Peter Fine MD  06/11/22 0106

## 2022-06-12 ENCOUNTER — TELEPHONE (OUTPATIENT)
Dept: EMERGENCY MEDICINE | Facility: HOSPITAL | Age: 67
End: 2022-06-12
Payer: MEDICARE

## 2022-06-14 ENCOUNTER — HOSPITAL ENCOUNTER (OUTPATIENT)
Dept: RADIOLOGY | Facility: HOSPITAL | Age: 67
Discharge: HOME | End: 2022-06-14
Attending: NURSE PRACTITIONER
Payer: MEDICARE

## 2022-06-14 DIAGNOSIS — S42.201A CLOSED FRACTURE OF PROXIMAL END OF RIGHT HUMERUS, UNSPECIFIED FRACTURE MORPHOLOGY, INITIAL ENCOUNTER: ICD-10-CM

## 2022-06-14 DIAGNOSIS — S42.201A CLOSED FRACTURE OF PROXIMAL END OF RIGHT HUMERUS, UNSPECIFIED FRACTURE MORPHOLOGY, INITIAL ENCOUNTER: Primary | ICD-10-CM

## 2022-06-14 PROCEDURE — 73030 X-RAY EXAM OF SHOULDER: CPT | Mod: 26,RT,, | Performed by: RADIOLOGY

## 2022-06-14 PROCEDURE — 73030 XR SHOULDER COMPLETE 2 OR MORE VIEWS RIGHT: ICD-10-PCS | Mod: 26,RT,, | Performed by: RADIOLOGY

## 2022-06-14 PROCEDURE — 73030 X-RAY EXAM OF SHOULDER: CPT | Mod: TC,RT

## 2022-06-28 ENCOUNTER — HOSPITAL ENCOUNTER (OUTPATIENT)
Dept: RADIOLOGY | Facility: HOSPITAL | Age: 67
Discharge: HOME | End: 2022-06-28
Attending: STUDENT IN AN ORGANIZED HEALTH CARE EDUCATION/TRAINING PROGRAM
Payer: MEDICARE

## 2022-06-28 DIAGNOSIS — M54.14 THORACIC RADICULITIS: ICD-10-CM

## 2022-06-28 PROCEDURE — 72128 CT CHEST SPINE W/O DYE: CPT | Mod: TC

## 2022-06-29 ENCOUNTER — HOSPITAL ENCOUNTER (OUTPATIENT)
Dept: RADIOLOGY | Facility: HOSPITAL | Age: 67
Discharge: HOME | End: 2022-06-29
Attending: ORTHOPAEDIC SURGERY
Payer: MEDICARE

## 2022-06-29 DIAGNOSIS — Z47.89 ORTHOPEDIC AFTERCARE: ICD-10-CM

## 2022-06-29 PROBLEM — S42.90XD: Status: ACTIVE | Noted: 2022-06-29

## 2022-06-29 PROCEDURE — 73030 X-RAY EXAM OF SHOULDER: CPT | Mod: TC,RT

## 2022-07-19 ENCOUNTER — HOSPITAL ENCOUNTER (OUTPATIENT)
Dept: RADIOLOGY | Facility: HOSPITAL | Age: 67
Discharge: HOME | End: 2022-07-19
Attending: NURSE PRACTITIONER
Payer: MEDICARE

## 2022-07-19 DIAGNOSIS — Z47.89 ORTHOPEDIC AFTERCARE: ICD-10-CM

## 2022-07-19 PROBLEM — Z09 FOLLOW UP: Status: ACTIVE | Noted: 2022-07-19

## 2022-07-19 PROCEDURE — 73030 XR SHOULDER COMPLETE 2 OR MORE VIEWS RIGHT: ICD-10-PCS | Mod: 26,RT,, | Performed by: RADIOLOGY

## 2022-07-19 PROCEDURE — 73030 X-RAY EXAM OF SHOULDER: CPT | Mod: 26,RT,, | Performed by: RADIOLOGY

## 2022-07-19 PROCEDURE — 73030 X-RAY EXAM OF SHOULDER: CPT | Mod: TC,RT

## 2022-08-12 ENCOUNTER — HOSPITAL ENCOUNTER (EMERGENCY)
Facility: HOSPITAL | Age: 67
Discharge: HOME | End: 2022-08-12
Payer: MEDICARE

## 2022-08-12 VITALS
DIASTOLIC BLOOD PRESSURE: 71 MMHG | BODY MASS INDEX: 17.56 KG/M2 | SYSTOLIC BLOOD PRESSURE: 103 MMHG | HEART RATE: 90 BPM | RESPIRATION RATE: 18 BRPM | OXYGEN SATURATION: 97 % | WEIGHT: 96 LBS | TEMPERATURE: 98 F

## 2022-08-12 DIAGNOSIS — R10.9 ABDOMINAL PAIN, UNSPECIFIED ABDOMINAL LOCATION: ICD-10-CM

## 2022-08-12 DIAGNOSIS — K86.89 PANCREATIC DUCT DILATED: Primary | ICD-10-CM

## 2022-08-12 DIAGNOSIS — C22.9 MALIGNANT NEOPLASM OF LIVER, UNSPECIFIED LIVER MALIGNANCY TYPE: ICD-10-CM

## 2022-08-12 DIAGNOSIS — K59.00 CONSTIPATION, UNSPECIFIED CONSTIPATION TYPE: ICD-10-CM

## 2022-08-12 DIAGNOSIS — C34.90 MALIGNANT NEOPLASM OF LUNG, UNSPECIFIED LATERALITY, UNSPECIFIED PART OF LUNG: ICD-10-CM

## 2022-08-12 PROCEDURE — 96374 THER/PROPH/DIAG INJ IV PUSH: CPT

## 2022-08-12 PROCEDURE — 96376 TX/PRO/DX INJ SAME DRUG ADON: CPT

## 2022-08-12 PROCEDURE — 96361 HYDRATE IV INFUSION ADD-ON: CPT

## 2022-08-12 PROCEDURE — 99283 PR EMERGENCY DEPT VISIT,LEVEL III: ICD-10-PCS | Mod: ,,, | Performed by: NURSE PRACTITIONER

## 2022-08-12 PROCEDURE — 99284 EMERGENCY DEPT VISIT MOD MDM: CPT | Mod: 25

## 2022-08-12 PROCEDURE — 63600175 PHARM REV CODE 636 W HCPCS: Performed by: NURSE PRACTITIONER

## 2022-08-12 PROCEDURE — 96375 TX/PRO/DX INJ NEW DRUG ADDON: CPT

## 2022-08-12 PROCEDURE — 25000003 PHARM REV CODE 250: Performed by: NURSE PRACTITIONER

## 2022-08-12 PROCEDURE — 99283 EMERGENCY DEPT VISIT LOW MDM: CPT | Mod: ,,, | Performed by: NURSE PRACTITIONER

## 2022-08-12 RX ORDER — ONDANSETRON 2 MG/ML
4 INJECTION INTRAMUSCULAR; INTRAVENOUS
Status: COMPLETED | OUTPATIENT
Start: 2022-08-12 | End: 2022-08-12

## 2022-08-12 RX ORDER — SODIUM CHLORIDE 9 MG/ML
1000 INJECTION, SOLUTION INTRAVENOUS
Status: COMPLETED | OUTPATIENT
Start: 2022-08-12 | End: 2022-08-12

## 2022-08-12 RX ORDER — MEPERIDINE HYDROCHLORIDE 25 MG/ML
25 INJECTION INTRAMUSCULAR; INTRAVENOUS; SUBCUTANEOUS
Status: COMPLETED | OUTPATIENT
Start: 2022-08-12 | End: 2022-08-12

## 2022-08-12 RX ADMIN — ONDANSETRON 4 MG: 2 INJECTION INTRAMUSCULAR; INTRAVENOUS at 05:08

## 2022-08-12 RX ADMIN — MEPERIDINE HYDROCHLORIDE 25 MG: 25 INJECTION INTRAMUSCULAR; INTRAVENOUS; SUBCUTANEOUS at 05:08

## 2022-08-12 RX ADMIN — MEPERIDINE HYDROCHLORIDE 25 MG: 25 INJECTION INTRAMUSCULAR; INTRAVENOUS; SUBCUTANEOUS at 07:08

## 2022-08-12 RX ADMIN — ONDANSETRON 4 MG: 2 INJECTION INTRAMUSCULAR; INTRAVENOUS at 07:08

## 2022-08-12 RX ADMIN — SODIUM CHLORIDE 1000 ML: 9 INJECTION, SOLUTION INTRAVENOUS at 05:08

## 2022-08-12 NOTE — ED PROVIDER NOTES
"Encounter Date: 8/12/2022       History     Chief Complaint   Patient presents with    Abdominal Pain     68 y/o WF with PMH of HTN, DM, lung cancer with mets to the liver and left hip presents with c/o abdominal pain. States she was seen at [facility] ED last night, had a CT scan of her abd and pelvis that revealed "moderate fluid filled distention of the stomach; mildly progressed pancreatic duct dilation within the head and neck. Consider Endoscopy/ERCP". States they were offered admission vs home with f/u with GI Doctor today. Pt opted to discharge home with f/u with GI doctor today; however, when she called their office she was notified that Dr. Newell was out for an extended period of time and for her to come back to the ED due to her still having pain. She reports associated nausea, decreased appetite. She also reports constipation. She denies fever or chills. Has been taking her prescribed norco with minimal relief. There are no exacerbating or remitting factors.     The history is provided by the patient, medical records and a relative.     Review of patient's allergies indicates:  No Known Allergies  Past Medical History:   Diagnosis Date    Arthritis     Diabetes mellitus     Hypertension     Lung cancer     Lupus      Past Surgical History:   Procedure Laterality Date    BACK SURGERY      TUBLIGATION       History reviewed. No pertinent family history.  Social History     Tobacco Use    Smoking status: Never Smoker    Smokeless tobacco: Never Used   Substance Use Topics    Alcohol use: Never    Drug use: Never     Review of Systems   Constitutional: Positive for appetite change (decreased) and fatigue. Negative for chills and fever.   Respiratory: Negative for cough and shortness of breath.    Cardiovascular: Negative for chest pain and palpitations.   Gastrointestinal: Positive for abdominal pain, constipation and nausea. Negative for diarrhea and vomiting.   Genitourinary: Negative for " dysuria and hematuria.   Neurological: Positive for weakness. Negative for dizziness and headaches.   All other systems reviewed and are negative.      Physical Exam     Initial Vitals [08/12/22 1549]   BP Pulse Resp Temp SpO2   121/66 92 18 98.2 °F (36.8 °C) 97 %      MAP       --         Physical Exam    Constitutional: She appears well-developed. She is cooperative. She has a sickly appearance.   Elderly female, frail appearing   Cardiovascular: Normal rate, regular rhythm, normal heart sounds and normal pulses.   Pulmonary/Chest: Effort normal. She has decreased breath sounds in the right lower field.   Abdominal: Abdomen is soft. Bowel sounds are normal. There is abdominal tenderness in the left upper quadrant.     Neurological: She is alert and oriented to person, place, and time.   Skin: Skin is warm, dry and intact. Capillary refill takes less than 2 seconds.   Poor turgor   Psychiatric: She has a normal mood and affect. Her speech is normal and behavior is normal. Judgment and thought content normal. Cognition and memory are normal.         Medical Screening Exam   See Full Note    ED Course   Procedures  Labs Reviewed - No data to display       Imaging Results    None          Medications   meperidine (PF) injection 25 mg (25 mg Intravenous Given 8/12/22 1715)   ondansetron injection 4 mg (4 mg Intravenous Given 8/12/22 1715)   0.9%  NaCl infusion (0 mLs Intravenous Stopped 8/12/22 1835)   meperidine (PF) injection 25 mg (25 mg Intravenous Given 8/12/22 1905)   ondansetron injection 4 mg (4 mg Intravenous Given 8/12/22 1905)     Medical Decision Making:   ED Management:  Reviewed patient's records and work up from [facility]. Discussed with patient and daughter at bedside that services that patient needed were unavailable at our facility. We could attempt transfer to a facility with available services; however, I could not guarantee where this may be. Patient and family opted for pain control and stated  they would follow up with [facility] in the AM and request referral to GI services here.                 Counseled on supportive measures. Warning s/s discussed and return precautions given; the patient has v/u.      Clinical Impression:   Final diagnoses:  [R10.9] Abdominal pain, unspecified abdominal location  [K86.89] Pancreatic duct dilated (Primary)  [C34.90] Malignant neoplasm of lung, unspecified laterality, unspecified part of lung  [C22.9] Malignant neoplasm of liver, unspecified liver malignancy type  [K59.00] Constipation, unspecified constipation type          ED Disposition Condition    Discharge Stable        ED Prescriptions     None        Follow-up Information     Follow up With Specialties Details Why Contact Info    Marisela Peguero MD Nurse Practitioner In 1 week  [address] [facility] [address]  [phone]             ZEYNEP Robison  08/12/22 7051

## 2022-08-12 NOTE — ED TRIAGE NOTES
Patient presents with abdominal pain that started 4-5 days ago. Patient was seen at [facility] ER last night and told to follow-up with GI today. Informed that GI doctor was gone. Patient has lung cancer with mets to the liver and left hip.. Recently diagnosed in May.. Appt to start immunotherapy on Tuesday. CT last night showed a blocked duct in the pancreas. Onc is Dr. Morrow, GI is Dr Newell.

## 2022-08-12 NOTE — DISCHARGE INSTRUCTIONS
Continue to take medications as previously directed. Follow up with your primary care provider within 1 week for recheck and continued care and management. A referral has been sent to GI, they will call you with further directions. Return to the ED for worsening signs and symptoms or otherwise as needed.

## 2022-08-15 DIAGNOSIS — Z09 FOLLOW UP: Primary | ICD-10-CM

## 2022-09-12 ENCOUNTER — HOSPITAL ENCOUNTER (OUTPATIENT)
Dept: RADIOLOGY | Facility: HOSPITAL | Age: 67
Discharge: HOME | End: 2022-09-12
Attending: ORTHOPAEDIC SURGERY
Payer: MEDICARE

## 2022-09-12 DIAGNOSIS — Z47.89 ORTHOPEDIC AFTERCARE: ICD-10-CM

## 2022-09-12 PROCEDURE — 73030 X-RAY EXAM OF SHOULDER: CPT | Mod: TC,RT

## 2022-09-20 ENCOUNTER — CLINICAL SUPPORT (OUTPATIENT)
Dept: REHABILITATION | Facility: HOSPITAL | Age: 67
End: 2022-09-20
Payer: MEDICARE

## 2022-09-20 DIAGNOSIS — M25.511 ACUTE PAIN OF RIGHT SHOULDER: ICD-10-CM

## 2022-09-20 DIAGNOSIS — Z47.89 ORTHOPEDIC AFTERCARE: ICD-10-CM

## 2022-09-20 PROCEDURE — 97161 PT EVAL LOW COMPLEX 20 MIN: CPT

## 2022-09-20 PROCEDURE — 97110 THERAPEUTIC EXERCISES: CPT

## 2022-09-20 NOTE — PLAN OF CARE
[facility]   Physical Therapy Initial Evaluation    Name: Ciera Johnson  Clinic Number: [id number]    Therapy Diagnosis:   Encounter Diagnoses   Name Primary?    Orthopedic aftercare     Acute pain of right shoulder      Physician: Temo Valdovinos MD    Physician Orders: PT Eval and Treat   Medical Diagnosis from Referral: R humerus fracture   Evaluation Date: 9/20/2022  Authorization Period Expiration: 9/20/2023  Plan of Care Expiration: 11/1/2022  Visit # / Visits authorized: 1/12    Time In: 8:02  Time Out: 9:10  Total Appointment Time (timed & untimed codes): 68 minutes (8 minutes not billable for ice)     Precautions: cancer and diabetic NO MODALITIES     Subjective   Date of onset: 6/10/2022  History of current condition - Ciera reports: that she fell down her step in June of this year and fractured her humerus in multiple places.      Medical History:   Past Medical History:   Diagnosis Date    Arthritis     Diabetes mellitus     Hypertension     Lung cancer     Lupus        Surgical History:   Ciera Johnson  has a past surgical history that includes TUBLIGATION and Back surgery.    Medications:   Ciera has a current medication list which includes the following prescription(s): albuterol, apixaban, benzonatate, ferosul, gabapentin, gabapentin, glipizide, hydrocodone-acetaminophen, hydrocodone-acetaminophen, toujeo max u-300 solostar, magnesium oxide, metformin, metoprolol succinate, pantoprazole, pravastatin, sotalol, and toujeo solostar u-300 insulin.    Allergies:   Review of patient's allergies indicates:  No Known Allergies     Imaging, X-rays    Prior Therapy: None   Social History:  lives with their spouse  Occupation: unemployed   Prior Level of Function: Independent   Current Level of Function: Patient requires assistance with ADLs and grooming tasks.     Pain:  Current 8/10, worst 10/10, best 6/10   Location: right shoulder   Description: Aching and Sharp  Aggravating Factors: certain  "movements   Easing Factors: pain medication    Pts goals: "to get my shoulder back like it used to be."     Objective     Posture: rounded shoulders yes, forward head yes, increased kyphotic curve yes, decreased lordotic curve no  Clear cervical spine: Spurling's test negative    Range of motion  Motion Right  Left    Shoulder flexion 85 degrees WITHIN FUNCTIONAL LIMITS   Shoulder extension Not tested  WITHIN FUNCTIONAL LIMITS   Shoulder abduction 43 degrees  WITHIN FUNCTIONAL LIMITS   Shoulder internal rotation 49 degrees  WITHIN FUNCTIONAL LIMITS   Shoulder external rotation 19 degrees  WITHIN FUNCTIONAL LIMITS   Elbow flexion WITHIN FUNCTIONAL LIMITS WITHIN FUNCTIONAL LIMITS   Elbow extension WITHIN FUNCTIONAL LIMITS WITHIN FUNCTIONAL LIMITS   Wrist flexion WITHIN FUNCTIONAL LIMITS WITHIN FUNCTIONAL LIMITS   Wrist extension WITHIN FUNCTIONAL LIMITS WITHIN FUNCTIONAL LIMITS   Ulnar deviation WITHIN FUNCTIONAL LIMITS WITHIN FUNCTIONAL LIMITS   Radial deviation WITHIN FUNCTIONAL LIMITS WITHIN FUNCTIONAL LIMITS     Passive range of motion: All R Shoulder measurements are PROM today.     MANUAL MUSCLE TEST  Muscle Right  Left    Shoulder flexion MMT strength: 2-/5 MMT strength: 4/5   Shoulder extension MMT strength: 2-/5 MMT strength: 4/5   Shoulder abduction MMT strength: 2-/5 MMT strength: 4/5   Shoulder internal rotation MMT strength: 2-/5 MMT strength: 4/5   Shoulder external rotation MMT strength: 2-/5 MMT strength: 4/5   Elbow flexion MMT strength: 3/5 MMT strength: 4/5   Elbow extension MMT strength: 3/5 MMT strength: 4/5   Wrist flexion MMT strength: 3+/5 MMT strength: 4/5   Wrist extension MMT strength: 3+/5 MMT strength: 4/5   Ulnar deviation MMT strength: 3+/5 MMT strength: 4/5   Radial deviation MMT strength: 3+/5 MMT strength: 4/5     Functional ability:  Dressing: AMB PT LEVEL OF ASSISTANCE: min A  Driving: AMB PT LEVEL OF ASSISTANCE: dependent   Overhead activity: AMB PT LEVEL OF ASSISTANCE: dependent " "  Work/hobbies: AMB PT LEVEL OF ASSISTANCE: mod A      Clinical test:  No special tests due to post fracture     TREATMENT   Treatment Time In: 8:20  Treatment Time Out: 9:10  Total Treatment time (time-based codes) separate from Evaluation: 50 minutes (42 minutes billable and 8 minutes not billable for ice)     Ciera received the treatments listed below:  THERAPEUTIC EXERCISES to develop strength, endurance, ROM, flexibility, and posture for 42 minutes including : See flowsheet below     Ther-Ex Reps    Pulleys (flexion and scaption)  3 minutes each    Ball Rolls (flexion and abduction)  10 x 10" each    Finger Ladder ----   Scap retractions 30 x 3"    Cane ER stretch  10 x 10" (standing)    Cane flexion stretch 10 x 10"    Cane ABD stretch  10 x 10 "    Sleeper stretch ------         cold pack for 8  minutes to R shoulder .    Home Exercises and Patient Education Provided       Education provided:   - importance of allowing R arm to rest in full elbow extension at home    Written Home Exercises Provided: yes.  Exercises were reviewed and Ciear was able to demonstrate them prior to the end of the session.  Ciera demonstrated good  understanding of the education provided.     See EMR under Patient Instructions for exercises provided 9/20/2022.    Assessment   Ciera is a 67 y.o. female referred to outpatient Physical Therapy with a medical diagnosis of R humerus fracture . Pt presents with R shoulder pain, decreased R shoulder ROM, and decreased R UE strength. Patient's impairments have limited her independence and activity tolerance with ADLs.     PT provided verbal, visual, and tactile cueing for proper posture, form, hold times, and decreased compensations with ther-ex. Patient required increased time and maximal cueing to progress through exercises due to pain and guarding. PT provided maximal cueing for decreased guarding during PROM to improve ROM. Patient had no reports of increased pain or adverse effects to " treatment.     Pt prognosis is Good.   Pt will benefit from skilled outpatient Physical Therapy to address the deficits stated above and in the chart below, provide pt/family education, and to maximize pt's level of independence.     Plan of care discussed with patient: Yes  Pt's spiritual, cultural and educational needs considered and patient is agreeable to the plan of care and goals as stated below:     Anticipated Barriers for therapy: chronicity of problem, guarding, multiple medical co morbidities, inability to manage pain with modalities due to active cancer, inability to mobilize joint due to cancer of bones    Goals: (to be met by end of POC)   Pt will increase L shoulder flexion to 100 degrees, external rotation to base oc occiput, and internal rotation to L4/5 for independent dressing  Pt will increase L upper extremity to 3+/5 to allow patient to perform activities of daily living independently  Pt will report decrease in pain to 4/10 to improve quality of life  Pt will place 3 pound object in overhead shelf without hike and with less than or equal to 4/10 pain to allow patient to return to prior level of function    Plan   Plan of care Certification: 9/20/2022 to 11/1/2022.    Outpatient Physical Therapy 2 times weekly for 6 weeks to include the following interventions: Manual Therapy, Moist Heat/ Ice, Patient Education, Therapeutic Activities, and Therapeutic Exercise.     Javi Muñoz, PT, DPT

## 2022-09-21 ENCOUNTER — CLINICAL SUPPORT (OUTPATIENT)
Dept: REHABILITATION | Facility: HOSPITAL | Age: 67
End: 2022-09-21
Payer: MEDICARE

## 2022-09-21 DIAGNOSIS — M25.511 ACUTE PAIN OF RIGHT SHOULDER: Primary | ICD-10-CM

## 2022-09-21 PROCEDURE — 97110 THERAPEUTIC EXERCISES: CPT | Mod: CQ

## 2022-09-21 NOTE — PROGRESS NOTES
"[facility]   Physical Therapy Treatment Note     Name: Ciera Johnson  Clinic Number: [id number]    Therapy Diagnosis:   Encounter Diagnosis   Name Primary?    Acute pain of right shoulder Yes     Physician: Temo Valdovinos MD    Visit Date: 9/21/2022    Therapy Diagnosis:        Encounter Diagnoses   Name Primary?    Orthopedic aftercare      Acute pain of right shoulder        Physician: Temo Valdovinos MD     Physician Orders: PT Eval and Treat   Medical Diagnosis from Referral: R humerus fracture   Evaluation Date: 9/20/2022  Authorization Period Expiration: 9/20/2023  Plan of Care Expiration: 11/1/2022  Visit # / Visits authorized: 2/12     Precautions: cancer and diabetic NO MODALITIES     PTA Visit #: 1/5    Time In: 8:55  Time Out: 9:35  Total Billable Time: 30 minutes, 10 non billable for CP     SUBJECTIVE     Pt reports:  No new complaints upon arrival to PT treatment session.     She was compliant with home exercise program.  Response to previous treatment: Post treatment soreness   Functional change: Early in POC     Pain: 7/10  Location: right UE/shoulder      OBJECTIVE     Objective Measures updated at progress report unless specified.     Treatment     Ciera received the treatments listed below:      THERAPEUTIC EXERCISES to develop strength, endurance, ROM, flexibility, and posture.  See flow-sheet below: Increased time on Pulleys.  Added Hand Gripper and Table slides flexion.      Ther-Ex Reps    Pulleys (flexion and scaption)  5 minutes each *   Ball Rolls (flexion and abduction)  10 x 10" each    Hand Gripper  30 x 3" Red *    Finger Ladder ----   Scap retractions 30 x 3"    Table slides Flexion  10 x 10" *    Cane ER stretch  10 x 10" (standing)    Cane flexion stretch 10 x 10"    Cane ABD stretch  10 x 10 "    Sleeper stretch ------            CP x 10 minutes to Right shoulder     Patient Education and Home Exercises     Home Exercises Provided and Patient " Education Provided     Education provided: POC, DOMS, HEP       Written Home Exercises Provided: yes. Exercises were reviewed and Ciera was able to demonstrate them prior to the end of the session.  Ciera demonstrated fair  understanding of the education provided. See EMR under Patient Instructions for exercises provided during therapy sessions    ASSESSMENT     Ciera is a 67 y.o. female referred to outpatient Physical Therapy with a medical diagnosis of R humerus fracture . Pt presents with R shoulder pain, decreased R shoulder ROM, and decreased R UE strength.  Patient requires mod cues x 3 to complete Ther Ex with proper alignment, speed of movement, count, and hold times.  Patient moves guardedly through completion of exercises.  Patient requires frequent rest breaks. ER presents to be most difficult for patient to complete.      Ciera Is progressing well towards her goals.   Pt prognosis is Good.     Pt will continue to benefit from skilled outpatient physical therapy to address the deficits listed in the problem list box on initial evaluation, provide pt/family education and to maximize pt's level of independence in the home and community environment.     Pt's spiritual, cultural and educational needs considered and pt agreeable to plan of care and goals.     Anticipated barriers to physical therapy:  chronicity of problem, guarding, multiple medical co morbidities, inability to manage pain with modalities due to active cancer, inability to mobilize joint due to cancer of bones    Goals:  (to be met by end of POC)   Pt will increase L shoulder flexion to 100 degrees, external rotation to base oc occiput, and internal rotation to L4/5 for independent dressing  Pt will increase L upper extremity to 3+/5 to allow patient to perform activities of daily living independently  Pt will report decrease in pain to 4/10 to improve quality of life  Pt will place 3 pound object in overhead shelf without hike and with less than  or equal to 4/10 pain to allow patient to return to prior level of function      PLAN     Plan of care Certification: 9/20/2022 to 11/1/2022.     Outpatient Physical Therapy 2 times weekly for 6 weeks to include the following interventions: Manual Therapy, Moist Heat/ Ice, Patient Education, Therapeutic Activities, and Therapeutic Exercise.     Continue POC Per PT order to progress patient toward rehab goals as tolerated by patient.      Anahi Maria, PTA 9/21/2022

## 2022-09-28 ENCOUNTER — CLINICAL SUPPORT (OUTPATIENT)
Dept: REHABILITATION | Facility: HOSPITAL | Age: 67
End: 2022-09-28
Payer: MEDICARE

## 2022-09-28 DIAGNOSIS — M25.511 ACUTE PAIN OF RIGHT SHOULDER: Primary | ICD-10-CM

## 2022-09-28 PROCEDURE — 97110 THERAPEUTIC EXERCISES: CPT

## 2022-09-28 NOTE — PROGRESS NOTES
"[facility]   Physical Therapy Treatment Note     Name: Ciera Johnson  Clinic Number: [id number]    Therapy Diagnosis:   Encounter Diagnosis   Name Primary?    Acute pain of right shoulder Yes     Physician: Temo Valdovinos MD    Visit Date: 9/28/2022    Therapy Diagnosis:        Encounter Diagnoses   Name Primary?    Orthopedic aftercare      Acute pain of right shoulder        Physician: Temo Valdovinos MD     Physician Orders: PT Eval and Treat   Medical Diagnosis from Referral: R humerus fracture   Evaluation Date: 9/20/2022  Authorization Period Expiration: 9/20/2023  Plan of Care Expiration: 11/1/2022  Visit # / Visits authorized: 3/12     Precautions: cancer and diabetic NO MODALITIES     PTA Visit #: 0/5    Time In: 8:55  Time Out: 9:41  Total Billable Time: 36 minutes, 10 non billable for CP     SUBJECTIVE     Pt reports:  "I have been doing everything I can to get better."     She was compliant with home exercise program.  Response to previous treatment: Post treatment soreness   Functional change: improving ROM for ADL's    Pain: 5/10  Location: right UE/shoulder      OBJECTIVE     Objective Measures updated at progress report unless specified.     Treatment     Ciera received the treatments listed below:      THERAPEUTIC EXERCISES to develop strength, endurance, ROM, flexibility, and posture.  See flow-sheet below: Added finger ladder     Ther-Ex Reps    Pulleys (flexion and scaption)  5 minutes each    Ball Rolls (flexion and abduction)  10 x 10" each    Hand Gripper  30 x 3" Red    Finger Ladder 5 x 10"   Scap retractions 30 x 3"    Table slides Flexion  10 x 10"    Cane ER stretch  10 x 10" (on mat today)   Cane flexion stretch 10 x 10"    Cane ABD stretch  10 x 10 "    Sleeper stretch ------            CP x 10 minutes to Right shoulder     Patient Education and Home Exercises     Home Exercises Provided and Patient Education Provided     Education provided: POC, " DOMS, HEP       Written Home Exercises Provided: yes. Exercises were reviewed and Ciera was able to demonstrate them prior to the end of the session.  Ciera demonstrated fair  understanding of the education provided. See EMR under Patient Instructions for exercises provided during therapy sessions    ASSESSMENT     Ciera is a 67 y.o. female referred to outpatient Physical Therapy with a medical diagnosis of R humerus fracture . Pt presents with R shoulder pain, decreased R shoulder ROM, and decreased R UE strength. Patient requires mod verbal and visual cues to complete Ther Ex with proper alignment, speed of movement, count, and hold times. Patient required rest breaks due to guarding and pain at end ROM with exercises. PT attempted PROM and stretching, but pt with increased guarding causing increased pain. Pt without adverse effects.     Ciera Is progressing well towards her goals.   Pt prognosis is Good.     Pt will continue to benefit from skilled outpatient physical therapy to address the deficits listed in the problem list box on initial evaluation, provide pt/family education and to maximize pt's level of independence in the home and community environment.     Pt's spiritual, cultural and educational needs considered and pt agreeable to plan of care and goals.     Anticipated barriers to physical therapy:  chronicity of problem, guarding, multiple medical co morbidities, inability to manage pain with modalities due to active cancer, inability to mobilize joint due to cancer of bones    Goals:  (to be met by end of POC)   Pt will increase L shoulder flexion to 100 degrees, external rotation to base oc occiput, and internal rotation to L4/5 for independent dressing  Pt will increase L upper extremity to 3+/5 to allow patient to perform activities of daily living independently  Pt will report decrease in pain to 4/10 to improve quality of life  Pt will place 3 pound object in overhead shelf without hike and with  less than or equal to 4/10 pain to allow patient to return to prior level of function      PLAN     Plan of care Certification: 9/20/2022 to 11/1/2022.     Outpatient Physical Therapy 2 times weekly for 6 weeks to include the following interventions: Manual Therapy, Moist Heat/ Ice, Patient Education, Therapeutic Activities, and Therapeutic Exercise.     Continue POC Per PT order to progress patient toward rehab goals as tolerated by patient.      Silvia Quispe, PT, DPT     9/28/2022

## 2022-09-30 ENCOUNTER — CLINICAL SUPPORT (OUTPATIENT)
Dept: REHABILITATION | Facility: HOSPITAL | Age: 67
End: 2022-09-30
Payer: MEDICARE

## 2022-09-30 DIAGNOSIS — M25.511 ACUTE PAIN OF RIGHT SHOULDER: Primary | ICD-10-CM

## 2022-09-30 PROCEDURE — 97110 THERAPEUTIC EXERCISES: CPT | Mod: CQ

## 2022-09-30 NOTE — PROGRESS NOTES
"[facility]   Physical Therapy Treatment Note     Name: Ciera Johnson  Clinic Number: [id number]    Therapy Diagnosis:   No diagnosis found.    Physician: Temo Valdovinos MD    Visit Date: 9/30/2022    Therapy Diagnosis:        Encounter Diagnoses   Name Primary?    Orthopedic aftercare      Acute pain of right shoulder        Physician: Temo Valdovinos MD     Physician Orders: PT Eval and Treat   Medical Diagnosis from Referral: R humerus fracture   Evaluation Date: 9/20/2022  Authorization Period Expiration: 9/20/2023  Plan of Care Expiration: 11/1/2022  Visit # / Visits authorized: 4/12     Precautions: cancer and diabetic NO MODALITIES     PTA Visit #: 1/5    Time In: 9:05  Time Out: 9:55  Total Billable Time: 36 minutes, 10 non billable for CP     SUBJECTIVE     Pt reports:  "My blood sugar dropped real low this morning about 7:00.  It dropped down to 30, but I ate something as quick as I could.  It was 111 right before I came to therapy.  It left me feeling ora drained, but I want to do my therapy".  Patient states her pain level is elevated today because she forgot to take pain pill.     She was compliant with home exercise program.  Response to previous treatment: Post treatment soreness   Functional change: improving ROM for ADL's    Pain: 5/10  Location: right UE/shoulder      OBJECTIVE     Objective Measures updated at progress report unless specified.     Treatment     Ciera received the treatments listed below:      THERAPEUTIC EXERCISES to develop strength, endurance, ROM, flexibility, and posture.  See flow-sheet below:     Ther-Ex Reps    Pulleys (flexion and scaption)  5 minutes each    Ball Rolls (flexion and abduction)  10 x 10" each    Hand Gripper  30 x 3" Red    Finger Ladder 5 x 10"   Scap retractions 30 x 3"    Table slides Flexion  10 x 10"    Cane ER stretch  10 x 10" (on mat today)   Cane flexion stretch 10 x 10"    Cane ABD stretch  10 x 10 "    Sleeper " stretch ------            CP x 10 minutes to Right shoulder     Patient Education and Home Exercises     Home Exercises Provided and Patient Education Provided     Education provided: POC, DOMS, HEP       Written Home Exercises Provided: yes. Exercises were reviewed and Ciera was able to demonstrate them prior to the end of the session.  Ciera demonstrated fair  understanding of the education provided. See EMR under Patient Instructions for exercises provided during therapy sessions    ASSESSMENT     Ciera is a 67 y.o. female referred to outpatient Physical Therapy with a medical diagnosis of R humerus fracture . Pt presents with R shoulder pain, decreased R shoulder ROM, and decreased R UE strength. Patient requires mod verbal and visual cues to complete Ther Ex with proper alignment, speed of movement, count, and hold times. Patient required rest breaks due to guarding and pain at end ROM with exercises. Gentle stretching and PROM  completed post Ther Ex.  Treatment session completed with CP application x 10 mins to decrease post treatment edema and pain.     Ciera Is progressing well towards her goals.   Pt prognosis is Good.     Pt will continue to benefit from skilled outpatient physical therapy to address the deficits listed in the problem list box on initial evaluation, provide pt/family education and to maximize pt's level of independence in the home and community environment.     Pt's spiritual, cultural and educational needs considered and pt agreeable to plan of care and goals.     Anticipated barriers to physical therapy:  chronicity of problem, guarding, multiple medical co morbidities, inability to manage pain with modalities due to active cancer, inability to mobilize joint due to cancer of bones    Goals:  (to be met by end of POC)   Pt will increase L shoulder flexion to 100 degrees, external rotation to base oc occiput, and internal rotation to L4/5 for independent dressing  Pt will increase L upper  extremity to 3+/5 to allow patient to perform activities of daily living independently  Pt will report decrease in pain to 4/10 to improve quality of life  Pt will place 3 pound object in overhead shelf without hike and with less than or equal to 4/10 pain to allow patient to return to prior level of function      PLAN     Plan of care Certification: 9/20/2022 to 11/1/2022.     Outpatient Physical Therapy 2 times weekly for 6 weeks to include the following interventions: Manual Therapy, Moist Heat/ Ice, Patient Education, Therapeutic Activities, and Therapeutic Exercise.     Continue POC Per PT order to progress patient toward rehab goals as tolerated by patient.      Anahi Maria, PTA, 9/30/2022

## 2022-10-04 ENCOUNTER — CLINICAL SUPPORT (OUTPATIENT)
Dept: REHABILITATION | Facility: HOSPITAL | Age: 67
End: 2022-10-04
Payer: MEDICARE

## 2022-10-04 PROCEDURE — 97110 THERAPEUTIC EXERCISES: CPT | Mod: CQ

## 2022-10-04 NOTE — PROGRESS NOTES
"[facility]   Physical Therapy Treatment Note     Name: Ciera Johnson  Clinic Number: [id number]    Therapy Diagnosis:   No diagnosis found.    Physician: Temo Valdovinos MD    Visit Date: 10/4/2022    Therapy Diagnosis:        Encounter Diagnoses   Name Primary?    Orthopedic aftercare      Acute pain of right shoulder        Physician: Temo Valdovinos MD     Physician Orders: PT Eval and Treat   Medical Diagnosis from Referral: R humerus fracture   Evaluation Date: 9/20/2022  Authorization Period Expiration: 9/20/2023  Plan of Care Expiration: 11/1/2022  Visit # / Visits authorized: 5/12     Precautions: cancer and diabetic NO MODALITIES     PTA Visit #: 2/5    Time In: 8:03  Time Out: 9:57  Total Billable Time: 44 minutes billable     SUBJECTIVE     Pt reports:  "My arm is getting better.  I reach the back of my neck, but not the back of my head".     She was compliant with home exercise program.  Response to previous treatment: Post treatment soreness   Functional change: improving ROM for ADL's    Pain: 7/10  Location: right UE/shoulder      OBJECTIVE     Objective Measures updated at progress report unless specified.     Treatment     Ciera received the treatments listed below:      THERAPEUTIC EXERCISES to develop strength, endurance, ROM, flexibility, and posture.  See flow-sheet below: Added Pendulums      Ther-Ex Reps    Pulleys (flexion and scaption)  5 minutes each    Ball Rolls (flexion and abduction)  10 x 10" each    Hand Gripper  30 x 3" Red    Finger Ladder 5 x 10"   Scap retractions 30 x 3"    Pendulums flex/ext.  30 x *    Pendulums cw/ccw  30 x *    Table slides Flexion  10 x 10"    Cane ER stretch  10 x 10" (on mat today)   Cane flexion stretch 10 x 10"    Cane ABD stretch  10 x 10 "    Sleeper stretch ------            CP x 10 minutes to Right shoulder     Patient Education and Home Exercises     Home Exercises Provided and Patient Education Provided "     Education provided: POC, DOMS, HEP       Written Home Exercises Provided: yes. Exercises were reviewed and Ciera was able to demonstrate them prior to the end of the session.  Ciera demonstrated fair  understanding of the education provided. See EMR under Patient Instructions for exercises provided during therapy sessions    ASSESSMENT     Ciera is a 67 y.o. female referred to outpatient Physical Therapy with a medical diagnosis of R humerus fracture . Pt presents with R shoulder pain, decreased R shoulder ROM, and decreased R UE strength. Patient requires mod verbal and visual cues to complete Ther Ex with proper alignment, speed of movement, count, and hold times.  Patient continues to demonstrate substitution completing Ther Ex, and muscle guarding evident with PROM.  Patient able to min decrease muscle guarding with verbal and tactile cues.  Patient without complaints of increased pain at end of treatment post CP application.     Ciera Is progressing well towards her goals.   Pt prognosis is Good.     Pt will continue to benefit from skilled outpatient physical therapy to address the deficits listed in the problem list box on initial evaluation, provide pt/family education and to maximize pt's level of independence in the home and community environment.     Pt's spiritual, cultural and educational needs considered and pt agreeable to plan of care and goals.     Anticipated barriers to physical therapy:  chronicity of problem, guarding, multiple medical co morbidities, inability to manage pain with modalities due to active cancer, inability to mobilize joint due to cancer of bones    Goals:  (to be met by end of POC)   Pt will increase L shoulder flexion to 100 degrees, external rotation to base oc occiput, and internal rotation to L4/5 for independent dressing  Pt will increase L upper extremity to 3+/5 to allow patient to perform activities of daily living independently  Pt will report decrease in pain to 4/10  to improve quality of life  Pt will place 3 pound object in overhead shelf without hike and with less than or equal to 4/10 pain to allow patient to return to prior level of function      PLAN     Plan of care Certification: 9/20/2022 to 11/1/2022.     Outpatient Physical Therapy 2 times weekly for 6 weeks to include the following interventions: Manual Therapy, Moist Heat/ Ice, Patient Education, Therapeutic Activities, and Therapeutic Exercise.     Continue POC Per PT order to progress patient toward rehab goals as tolerated by patient.      Anahi Maria, PTA, 10/4/2022

## 2022-10-06 ENCOUNTER — CLINICAL SUPPORT (OUTPATIENT)
Dept: REHABILITATION | Facility: HOSPITAL | Age: 67
End: 2022-10-06
Payer: MEDICARE

## 2022-10-06 DIAGNOSIS — M25.511 ACUTE PAIN OF RIGHT SHOULDER: Primary | ICD-10-CM

## 2022-10-06 PROCEDURE — 97110 THERAPEUTIC EXERCISES: CPT

## 2022-10-06 NOTE — PROGRESS NOTES
"[facility]   Physical Therapy Treatment Note     Name: Ciera Johnson  Clinic Number: [id number]    Therapy Diagnosis:   Encounter Diagnosis   Name Primary?    Acute pain of right shoulder Yes       Physician: Temo Valdovinos MD    Visit Date: 10/6/2022    Physician Orders: PT Eval and Treat   Medical Diagnosis from Referral: R humerus fracture   Evaluation Date: 9/20/2022  Authorization Period Expiration: 9/20/2023  Plan of Care Expiration: 11/1/2022  Visit # / Visits authorized: 6/12     Precautions: cancer and diabetic NO MODALITIES     PTA Visit #: 0/5    Time In: 7:59  Time Out: 8:50  Total Billable Time:  43 minutes billable and 8 minutes not billable for ice     SUBJECTIVE     Pt reports: "It's not hurting bad right now."     She was compliant with home exercise program.  Response to previous treatment: Post treatment soreness   Functional change: improving ROM for ADL's    Pain: 4/10  Location: right UE/shoulder      OBJECTIVE     Objective Measures updated at progress report unless specified.     Treatment     Ciera received the treatments listed below:      THERAPEUTIC EXERCISES to develop strength, endurance, ROM, flexibility, and posture.  See flow-sheet below:      Ther-Ex Reps    Pulleys (flexion and scaption)  5 minutes each    Ball Rolls (flexion and abduction)  10 x 10" each    Hand Gripper  30 x 3" Red    Finger Ladder 5 x 10"   Scap retractions 30 x 3"    Pendulums flex/ext.  30 x *    Pendulums cw/ccw  30 x *    Table slides Flexion  10 x 10"    Cane ER stretch  10 x 10"    Cane flexion stretch 10 x 10"    Cane ABD stretch  10 x 10 "    Sleeper stretch 10 x 10" (modified lying on back)             CP x 8 minutes to Right shoulder     Patient Education and Home Exercises     Home Exercises Provided and Patient Education Provided     Education provided: POC, DOMS, HEP     Written Home Exercises Provided: yes. Exercises were reviewed and Ciera was able to demonstrate them " "prior to the end of the session.  Ciera demonstrated fair  understanding of the education provided. See EMR under Patient Instructions for exercises provided during therapy sessions    ASSESSMENT     Ciera is a 67 y.o. female referred to outpatient Physical Therapy with a medical diagnosis of R humerus fracture . Pt presents with R shoulder pain, decreased R shoulder ROM, and decreased R UE strength. PT provided patient with verbal, visual, and tactile cueing for proper posture, form, and proper hold times during exercises. Patient continues to be guarded during PROM requiring cueing to relax. Patient reported "I am just going to keep pushing through the pain to get better."      Ciera Is progressing well towards her goals.   Pt prognosis is Good.     Pt will continue to benefit from skilled outpatient physical therapy to address the deficits listed in the problem list box on initial evaluation, provide pt/family education and to maximize pt's level of independence in the home and community environment.     Pt's spiritual, cultural and educational needs considered and pt agreeable to plan of care and goals.     Anticipated barriers to physical therapy:  chronicity of problem, guarding, multiple medical co morbidities, inability to manage pain with modalities due to active cancer, inability to mobilize joint due to cancer of bones    Goals:  (to be met by end of POC)   Pt will increase L shoulder flexion to 100 degrees, external rotation to base oc occiput, and internal rotation to L4/5 for independent dressing  Pt will increase L upper extremity to 3+/5 to allow patient to perform activities of daily living independently  Pt will report decrease in pain to 4/10 to improve quality of life  Pt will place 3 pound object in overhead shelf without hike and with less than or equal to 4/10 pain to allow patient to return to prior level of function      PLAN     Plan of care Certification: 9/20/2022 to 11/1/2022.   "   Outpatient Physical Therapy 2 times weekly for 6 weeks to include the following interventions: Manual Therapy, Moist Heat/ Ice, Patient Education, Therapeutic Activities, and Therapeutic Exercise.     Continue POC Per PT order to progress patient toward rehab goals as tolerated by patient.      Javi Muñoz, PT,DPT  10/6/2022

## 2022-10-11 ENCOUNTER — CLINICAL SUPPORT (OUTPATIENT)
Dept: REHABILITATION | Facility: HOSPITAL | Age: 67
End: 2022-10-11
Payer: MEDICARE

## 2022-10-11 DIAGNOSIS — M25.511 ACUTE PAIN OF RIGHT SHOULDER: Primary | ICD-10-CM

## 2022-10-11 PROCEDURE — 97110 THERAPEUTIC EXERCISES: CPT | Mod: CQ

## 2022-10-11 NOTE — PROGRESS NOTES
"[facility]   Physical Therapy Treatment Note     Name: Ciera Johnson  Clinic Number: [id number]    Therapy Diagnosis:   Encounter Diagnosis   Name Primary?    Acute pain of right shoulder Yes       Physician: Temo Valdovinos MD    Visit Date: 10/11/2022    Physician Orders: PT Eval and Treat   Medical Diagnosis from Referral: R humerus fracture   Evaluation Date: 9/20/2022  Authorization Period Expiration: 9/20/2023  Plan of Care Expiration: 11/1/2022  Visit # / Visits authorized: 7/12     Precautions: cancer and diabetic NO MODALITIES     PTA Visit #: 1/5    Time In: 8:00  Time Out: 8:50  Total Billable Time: 10 non billable for CP, 40 billable     SUBJECTIVE     Pt reports: "I'm hurting a little bit, but it's not too bad".     She was compliant with home exercise program.  Response to previous treatment: Post treatment soreness   Functional change: improving ROM for ADL's    Pain: 4/10  Location: right UE/shoulder      OBJECTIVE     Objective Measures updated at progress report unless specified.     Treatment     Ciera received the treatments listed below:      THERAPEUTIC EXERCISES to develop strength, endurance, ROM, flexibility, and posture.  See flow-sheet below: Added wall slides, and AROM flexion, scaption, and abduction.      Ther-Ex Reps    Pulleys (flexion and scaption)  5 minutes each    Ball Rolls (flexion and abduction)  10 x 10" each    Hand Gripper  30 x 3" Red    Finger Ladder 5 x 10"   Scap retractions 30 x 3"    Pendulums flex/ext.  30 x    Pendulums cw/ccw  30 x    I,Y,T 10 x each *    Table slides Flexion (changed to wall slides )  5 x 10 *    Cane ER stretch  10 x 10"    Cane flexion stretch 10 x 10"    Cane ABD stretch  10 x 10 "    Sleeper stretch 10 x 10" (modified lying on back)             CP x 8 minutes to Right shoulder     Patient Education and Home Exercises     Home Exercises Provided and Patient Education Provided     Education provided: POC, DOMS, HEP;  " Added wall slides to HEP     Written Home Exercises Provided: yes. Exercises were reviewed and Ciera was able to demonstrate them prior to the end of the session.  Ciera demonstrated fair  understanding of the education provided. See EMR under Patient Instructions for exercises provided during therapy sessions    ASSESSMENT     Ciera is a 67 y.o. female referred to outpatient Physical Therapy with a medical diagnosis of R humerus fracture . Pt presents with R shoulder pain, decreased R shoulder ROM, and decreased R UE strength. Patient requires mod cues to complete added AROM flexion, scaption, and abduction with proper form and to decrease shoulder hiking to compensate.  Patient presents with greatest muscle guarding completion AAROM Right shoulder ER/IR.  Patient does not report increased pain at end of treatment session, and verbalizes understanding to add wall slides within pain limitations to HEP.     Ciera Is progressing well towards her goals.   Pt prognosis is Good.     Pt will continue to benefit from skilled outpatient physical therapy to address the deficits listed in the problem list box on initial evaluation, provide pt/family education and to maximize pt's level of independence in the home and community environment.     Pt's spiritual, cultural and educational needs considered and pt agreeable to plan of care and goals.     Anticipated barriers to physical therapy:  chronicity of problem, guarding, multiple medical co morbidities, inability to manage pain with modalities due to active cancer, inability to mobilize joint due to cancer of bones    Goals:  (to be met by end of POC)   Pt will increase L shoulder flexion to 100 degrees, external rotation to base oc occiput, and internal rotation to L4/5 for independent dressing  Pt will increase L upper extremity to 3+/5 to allow patient to perform activities of daily living independently  Pt will report decrease in pain to 4/10 to improve quality of life  Pt  will place 3 pound object in overhead shelf without hike and with less than or equal to 4/10 pain to allow patient to return to prior level of function      PLAN     Plan of care Certification: 9/20/2022 to 11/1/2022.     Outpatient Physical Therapy 2 times weekly for 6 weeks to include the following interventions: Manual Therapy, Moist Heat/ Ice, Patient Education, Therapeutic Activities, and Therapeutic Exercise.     Continue POC Per PT order to progress patient toward rehab goals as tolerated by patient.      Anahi Maria, PTA,  10/11/2022

## 2022-10-14 ENCOUNTER — CLINICAL SUPPORT (OUTPATIENT)
Dept: REHABILITATION | Facility: HOSPITAL | Age: 67
End: 2022-10-14
Payer: MEDICARE

## 2022-10-14 DIAGNOSIS — M25.511 ACUTE PAIN OF RIGHT SHOULDER: Primary | ICD-10-CM

## 2022-10-14 PROCEDURE — 97110 THERAPEUTIC EXERCISES: CPT | Mod: CQ

## 2022-10-14 NOTE — PROGRESS NOTES
"[facility]   Physical Therapy Treatment Note     Name: Ciera Johnson  Clinic Number: [id number]    Therapy Diagnosis:   Encounter Diagnosis   Name Primary?    Acute pain of right shoulder Yes       Physician: Temo Valdovinos MD    Visit Date: 10/14/2022    Physician Orders: PT Eval and Treat   Medical Diagnosis from Referral: R humerus fracture   Evaluation Date: 9/20/2022  Authorization Period Expiration: 9/20/2023  Plan of Care Expiration: 11/1/2022  Visit # / Visits authorized: 8/12     Precautions: cancer and diabetic NO MODALITIES     PTA Visit #: 2/5    Time In: 8:04  Time Out: 8:59  Total Billable Time: 8 non billable for CP, 47 billable     SUBJECTIVE     Pt reports: "My shoulder is bothering me this morning, I don't know if it's this cold weather or what."     She was compliant with home exercise program.  Response to previous treatment: Post treatment soreness   Functional change: improving ROM for ADL's    Pain: 7/10  Location: right UE/shoulder      OBJECTIVE     Objective Measures updated at progress report unless specified.     Treatment     Ciera received the treatments listed below:      THERAPEUTIC EXERCISES to develop strength, endurance, ROM, flexibility, and posture.  See flow-sheet below:   Ther-Ex Reps    Pulleys (flexion and scaption)  5 minutes each    Ball Rolls (flexion and abduction)  10 x 10" each    Hand Gripper  30 x 3" Red    Finger Ladder 5 x 10"   Scap retractions 30 x 3"    Pendulums flex/ext.  30 x    Pendulums cw/ccw  30 x    I,Y,T 10 x each *    Table slides Flexion (changed to wall slides )  5 x 10 *    Cane ER stretch  10 x 10"    Cane flexion stretch 10 x 10"    Cane ABD stretch  10 x 10 "    Sleeper stretch 10 x 10" (modified lying on back)             CP x 8 minutes to Right shoulder     Patient Education and Home Exercises     Home Exercises Provided and Patient Education Provided     Education provided: POC, DOMS, HEP;  Added wall slides to " HEP     Written Home Exercises Provided: yes. Exercises were reviewed and Ciera was able to demonstrate them prior to the end of the session.  Ciera demonstrated fair  understanding of the education provided. See EMR under Patient Instructions for exercises provided during therapy sessions    ASSESSMENT     Ciera is a 67 y.o. female referred to outpatient Physical Therapy with a medical diagnosis of R humerus fracture . Pt presents with R shoulder pain, decreased R shoulder ROM, and decreased R UE strength.  Pt expresses that her shoulder felt less stiff following use of the pulleys. LPTA prompted pt with minimal verbal cues to decrease shoulder hiking. LPTA provided pt education to perform all shoulder ROM within limit of pain. Following verbal cues, pt maintained even shoulder level with all ROM activities.  Pt reported pain decreased to 4/10 following application of cold pack. Tx completed without any adverse effects.    Ciera Is progressing well towards her goals.   Pt prognosis is Good.     Pt will continue to benefit from skilled outpatient physical therapy to address the deficits listed in the problem list box on initial evaluation, provide pt/family education and to maximize pt's level of independence in the home and community environment.     Pt's spiritual, cultural and educational needs considered and pt agreeable to plan of care and goals.     Anticipated barriers to physical therapy:  chronicity of problem, guarding, multiple medical co morbidities, inability to manage pain with modalities due to active cancer, inability to mobilize joint due to cancer of bones    Goals:  (to be met by end of POC)   Pt will increase L shoulder flexion to 100 degrees, external rotation to base oc occiput, and internal rotation to L4/5 for independent dressing  Pt will increase L upper extremity to 3+/5 to allow patient to perform activities of daily living independently  Pt will report decrease in pain to 4/10 to improve  quality of life  Pt will place 3 pound object in overhead shelf without hike and with less than or equal to 4/10 pain to allow patient to return to prior level of function      PLAN     Plan of care Certification: 9/20/2022 to 11/1/2022.     Outpatient Physical Therapy 2 times weekly for 6 weeks to include the following interventions: Manual Therapy, Moist Heat/ Ice, Patient Education, Therapeutic Activities, and Therapeutic Exercise.     Continue POC Per PT order to progress patient toward rehab goals as tolerated by patient.      YOHANNES Galindo  10/14/2022

## 2022-10-24 PROBLEM — Z09 FOLLOW UP: Status: RESOLVED | Noted: 2022-07-19 | Resolved: 2022-10-24

## 2022-10-25 ENCOUNTER — CLINICAL SUPPORT (OUTPATIENT)
Dept: REHABILITATION | Facility: HOSPITAL | Age: 67
End: 2022-10-25
Payer: MEDICARE

## 2022-10-25 DIAGNOSIS — M25.511 ACUTE PAIN OF RIGHT SHOULDER: Primary | ICD-10-CM

## 2022-10-25 PROCEDURE — 97110 THERAPEUTIC EXERCISES: CPT | Mod: CQ

## 2022-10-25 NOTE — PROGRESS NOTES
"[facility]   Physical Therapy Treatment Note     Name: Ciera Johnson  Clinic Number: [id number]    Therapy Diagnosis:   No diagnosis found.      Physician: Temo Valdovinos MD    Visit Date: 10/25/2022    Physician Orders: PT Eval and Treat   Medical Diagnosis from Referral: R humerus fracture   Evaluation Date: 9/20/2022  Authorization Period Expiration: 9/20/2023  Plan of Care Expiration: 11/1/2022  Visit # / Visits authorized: 9/12     Precautions: cancer and diabetic NO MODALITIES     PTA Visit #: 2/5    Time In: 8:07  Time Out: 9:00  Total Billable Time: 53 total, 10 not billable for CP, 43 billable.     SUBJECTIVE     Pt reports: "I hate I had to miss some therapy visits.  I got put in the hospital because my heart rate went up to 180 pbm.  I'm still having trouble raisin my arm (R), and reaching behind my back".     She was compliant with home exercise program.  Response to previous treatment: Post treatment soreness   Functional change: improving ROM for ADL's    Pain: 7/10  Location: right UE/shoulder      OBJECTIVE     Objective Measures updated at progress report unless specified.     Treatment     Ciera received the treatments listed below:      THERAPEUTIC EXERCISES to develop strength, endurance, ROM, flexibility, and posture.  See flow-sheet below:   Ther-Ex Reps    Pulleys (flexion and scaption)  5 minutes each    Ball Rolls (flexion and abduction)  10 x 10" each    Hand Gripper  30 x 3" Red    Finger Ladder 5 x 10"   Scap retractions 30 x 3"    Pendulums flex/ext.  30 x    Pendulums cw/ccw  30 x    I,Y,T 10 x each *    Table slides Flexion (changed to wall slides )  5 x 10 *    Cane ER stretch  10 x 10"    Cane flexion stretch 10 x 10"    Cane ABD stretch  10 x 10 "    Sleeper stretch 10 x 10" (modified lying on back)             CP x 8 minutes to Right shoulder     Patient Education and Home Exercises     Home Exercises Provided and Patient Education Provided " "    Education provided: POC, DOMS, HEP;  Added wall slides to HEP     Written Home Exercises Provided: yes. Exercises were reviewed and Ciera was able to demonstrate them prior to the end of the session.  Ciera demonstrated fair  understanding of the education provided. See EMR under Patient Instructions for exercises provided during therapy sessions    ASSESSMENT     Ciera is a 67 y.o. female referred to outpatient Physical Therapy with a medical diagnosis of R humerus fracture . Pt presents with R shoulder pain, decreased R shoulder ROM, and decreased R UE strength.  Patient able to progress through exercises without complaints of increasing pain, but does report "feeling it" and difficulty moving into ER with wand.  Min substitution/shoulder hiking noted with AROM flexion, scaption, and abduction.  Patient without new complaints at end of PT treatment session.       Ciera Is progressing well towards her goals.   Pt prognosis is Good.     Pt will continue to benefit from skilled outpatient physical therapy to address the deficits listed in the problem list box on initial evaluation, provide pt/family education and to maximize pt's level of independence in the home and community environment.     Pt's spiritual, cultural and educational needs considered and pt agreeable to plan of care and goals.     Anticipated barriers to physical therapy:  chronicity of problem, guarding, multiple medical co morbidities, inability to manage pain with modalities due to active cancer, inability to mobilize joint due to cancer of bones    Goals:  (to be met by end of POC)   Pt will increase L shoulder flexion to 100 degrees, external rotation to base oc occiput, and internal rotation to L4/5 for independent dressing  Pt will increase L upper extremity to 3+/5 to allow patient to perform activities of daily living independently  Pt will report decrease in pain to 4/10 to improve quality of life  Pt will place 3 pound object in overhead " shelf without hike and with less than or equal to 4/10 pain to allow patient to return to prior level of function      PLAN     Plan of care Certification: 9/20/2022 to 11/1/2022.     Outpatient Physical Therapy 2 times weekly for 6 weeks to include the following interventions: Manual Therapy, Moist Heat/ Ice, Patient Education, Therapeutic Activities, and Therapeutic Exercise.     Continue POC Per PT order to progress patient toward rehab goals as tolerated by patient.      YOHANNES Reyes  10/25/2022

## 2022-10-27 ENCOUNTER — CLINICAL SUPPORT (OUTPATIENT)
Dept: REHABILITATION | Facility: HOSPITAL | Age: 67
End: 2022-10-27
Payer: MEDICARE

## 2022-10-27 DIAGNOSIS — M25.511 ACUTE PAIN OF RIGHT SHOULDER: Primary | ICD-10-CM

## 2022-10-27 PROCEDURE — 97110 THERAPEUTIC EXERCISES: CPT | Mod: CQ

## 2022-10-27 NOTE — PROGRESS NOTES
"[facility]   Physical Therapy Treatment Note     Name: Ciera Johnson  Clinic Number: [id number]    Therapy Diagnosis:   Encounter Diagnosis   Name Primary?    Acute pain of right shoulder Yes         Physician: Temo Valdovinos MD    Visit Date: 10/27/2022    Physician Orders: PT Eval and Treat   Medical Diagnosis from Referral: R humerus fracture   Evaluation Date: 9/20/2022  Authorization Period Expiration: 9/20/2023  Plan of Care Expiration: 11/11/2022  Visit # / Visits authorized: 10/16     Precautions: cancer and diabetic NO MODALITIES     PTA Visit #: 3/5    Time In: 14:53  Time Out: 15:52  Total Billable Time: 59 total, 8 not billable for CP, 51 billable.     SUBJECTIVE     Pt reports: "My right arm still really hurts".     She was compliant with home exercise program.  Response to previous treatment: Post treatment soreness   Functional change: improving ROM for ADL's    Pain: 8/10  Location: right UE/shoulder      OBJECTIVE     Objective Measures updated at progress report unless specified.     Treatment     Ciera received the treatments listed below:      THERAPEUTIC EXERCISES to develop strength, endurance, ROM, flexibility, and posture.  See flow-sheet below: Added Scapular Elevation with wand  Ther-Ex Reps    Pulleys (flexion and scaption)  5 minutes each    Ball Rolls (flexion and abduction)  10 x 10" each    Hand Gripper  30 x 3" Red    Finger Ladder 5 x 10"   Scap retractions 30 x 3"    Pendulums flex/ext.  30 x    Pendulums cw/ccw  30 x    I,Y,T 10 x each    Table slides Flexion (changed to wall slides )  5 x 10"   Cane ER stretch  10 x 10"    Cane flexion stretch 10 x 10"    Cane ABD stretch  10 x 10 "    Sleeper stretch 10 x 10" (modified lying on back)    Cane Scapular elevation  10 x 5" (behind back)      CP x 8 minutes to Right shoulder     Patient Education and Home Exercises     Home Exercises Provided and Patient Education Provided     Education provided: POC, " BRINDA, HEP;  Added wall slides to HEP     Written Home Exercises Provided: yes. Exercises were reviewed and Ciera was able to demonstrate them prior to the end of the session.  Ciera demonstrated fair  understanding of the education provided. See EMR under Patient Instructions for exercises provided during therapy sessions    ASSESSMENT     Ciera is a 67 y.o. female referred to outpatient Physical Therapy with a medical diagnosis of R humerus fracture. Pt presents with R shoulder pain, decreased R shoulder ROM, and decreased R UE strength.  LPTA prompted pt with verbal cues during use of the pulleys to decrease speed of movement and focus on getting a good stretch in right shoulder. Pt prompted to focus on scapular retraction prior to doing all shoulder flexion motions. Pt displayed minimal shoulder hiking and maintained equal shoulder height with all flexion and scaption activities. Pt required manual assistance from LPTA to maintain proper elbow and shoulder alignment during abduction with wand. Pt displays decreased ROM with shoulder ER.  .       Ciera Is progressing well towards her goals.   Pt prognosis is Good.     Pt will continue to benefit from skilled outpatient physical therapy to address the deficits listed in the problem list box on initial evaluation, provide pt/family education and to maximize pt's level of independence in the home and community environment.     Pt's spiritual, cultural and educational needs considered and pt agreeable to plan of care and goals.     Anticipated barriers to physical therapy:  chronicity of problem, guarding, multiple medical co morbidities, inability to manage pain with modalities due to active cancer, inability to mobilize joint due to cancer of bones    Goals:  (to be met by end of POC)   Pt will increase L shoulder flexion to 100 degrees, external rotation to base oc occiput, and internal rotation to L4/5 for independent dressing  Pt will increase L upper extremity to  3+/5 to allow patient to perform activities of daily living independently  Pt will report decrease in pain to 4/10 to improve quality of life  Pt will place 3 pound object in overhead shelf without hike and with less than or equal to 4/10 pain to allow patient to return to prior level of function      PLAN     Plan of care Certification: 9/20/2022 to 11/1/2022.     Outpatient Physical Therapy 2 times weekly for 2 weeks to include the following interventions: Manual Therapy, Moist Heat/ Ice, Patient Education, Therapeutic Activities, and Therapeutic Exercise.     Continue POC Per PT order to progress patient toward rehab goals as tolerated by patient.  Plan to recert per PT order.    YOHANNES Galindo  10/27/2022

## 2022-10-27 NOTE — PLAN OF CARE
[facility] Re-Certification Note  Name: Ciera Johnson  Clinic Number: [id number]     Therapy Diagnosis:        Encounter Diagnosis   Name Primary?    Acute pain of right shoulder Yes            Physician: Temo Valdovinos MD     Visit Date: 10/27/2022     Physician Orders: PT Eval and Treat   Medical Diagnosis from Referral: R humerus fracture   Evaluation Date: 9/20/2022  Authorization Period Expiration: 9/20/2023  Plan of Care Expiration: 11/11/2022  Visits completed at time of recert: 10/16      Current Goal Status  Pt will increase L shoulder flexion to 100 degrees, external rotation to base oc occiput, and internal rotation to L4/5 for independent dressing- Goal in progress  Pt will increase L upper extremity to 3+/5 to allow patient to perform activities of daily living independently- Goal in progress  Pt will report decrease in pain to 4/10 to improve quality of life-Goal in progress  Pt will place 3 pound object in overhead shelf without hike and with less than or equal to 4/10 pain to allow patient to return to prior level of function  -Goal in progress        Therapist's Impression  Patient's progress with PT interventions has been slower than expected due to setback with other medical conditions and decreased strength related to cancer treatments. Patient can benefit from continued skilled PT services to continue progressing toward rehab goals and improving functional use of L UE.      Plan  Recert 2 x 2 weeks to begin week of 10/31/2022. Patient has 14 visit maximum allowed by insurance.      Javi Muñoz, PT, DPT

## 2022-11-01 ENCOUNTER — CLINICAL SUPPORT (OUTPATIENT)
Dept: REHABILITATION | Facility: HOSPITAL | Age: 67
End: 2022-11-01
Payer: MEDICARE

## 2022-11-01 DIAGNOSIS — M25.511 ACUTE PAIN OF RIGHT SHOULDER: Primary | ICD-10-CM

## 2022-11-01 PROCEDURE — 97110 THERAPEUTIC EXERCISES: CPT | Mod: CQ

## 2022-11-01 NOTE — PROGRESS NOTES
"[facility]   Physical Therapy Treatment Note     Name: Ciera Johnson  Clinic Number: [id number]    Therapy Diagnosis:   Encounter Diagnosis   Name Primary?    Acute pain of right shoulder Yes         Physician: Temo Valdovinos MD    Visit Date: 11/1/2022    Physician Orders: PT Eval and Treat   Medical Diagnosis from Referral: R humerus fracture   Evaluation Date: 9/20/2022  Authorization Period Expiration: 9/20/2023  Plan of Care Expiration: 11/11/2022  Visit # / Visits authorized: 11/16     Precautions: cancer and diabetic NO MODALITIES     PTA Visit #: 4/5    Time In: 13:02  Time Out: 14:00  Total Billable Time: 48 minutes billable     SUBJECTIVE     Pt reports:  "My shoulder hurts today, but I am want it to get moving".     She was compliant with home exercise program.  Response to previous treatment: Post treatment soreness   Functional change: improving ROM for ADL's    Pain: 8/10  Location: right UE/shoulder      OBJECTIVE     Objective Measures updated at progress report unless specified.     Treatment     Ciera received the treatments listed below:      THERAPEUTIC EXERCISES to develop strength, endurance, ROM, flexibility, and posture.  See flow-sheet below:  Added UBE, scap retraction with Thera Cord, IR/ER with Ther Cord, shoulder extension with Thera Cord, and shoulder flexion with TheraCord.  Added IR sheet stretch.     Ther-Ex Reps    UBE 3/3   Pulleys (flexion and scaption)  5 minutes each    Ball Rolls (flexion and abduction)  10 x 10" each    Hand Gripper  30 x 3" Red    Finger Ladder 5 x 10"   Scap retractions with Thera Cord  2 x 10, Red Thera Band *    IR/ER with Thera Cord  2 x 10, Red Thera Band *    Shoulder Extension with Thera Cord  2 x 10, Red *    Shoulder Flexion with Thera Cord  10 x Red *    Pendulums flex/ext.  30 x    Pendulums cw/ccw  30 x    I,Y,T 10 x each    Table slides Flexion (changed to wall slides )  5 x 10"   Cane ER stretch  10 x 10"    Cane " "flexion stretch 10 x 10"    Cane ABD stretch  10 x 10 "    IR stretch with sheet  5 x 10" *    Cane Scapular elevation  10 x 5" (behind back)      CP x 8 minutes to Right shoulder     Patient Education and Home Exercises     Home Exercises Provided and Patient Education Provided     Education provided: POC, DOMS, HEP;  Added wall slides to HEP;  Added IR stretch with towel.     Written Home Exercises Provided: yes. Exercises were reviewed and Ciera was able to demonstrate them prior to the end of the session.  Ciera demonstrated fair  understanding of the education provided. See EMR under Patient Instructions for exercises provided during therapy sessions    ASSESSMENT     Ciera is a 67 y.o. female referred to outpatient Physical Therapy with a medical diagnosis of R humerus fracture. Pt presents with R shoulder pain, decreased R shoulder ROM, and decreased R UE strength.  Added Ther Ex to increase strength and ROM as tolerated by patient.  Patient requires mod cues to complete Ther Ex with proper alignment, speed of movement, count, and hold times.  No adverse effects noted.     Ciera Is progressing well towards her goals.   Pt prognosis is Good.     Pt will continue to benefit from skilled outpatient physical therapy to address the deficits listed in the problem list box on initial evaluation, provide pt/family education and to maximize pt's level of independence in the home and community environment.     Pt's spiritual, cultural and educational needs considered and pt agreeable to plan of care and goals.     Anticipated barriers to physical therapy:  chronicity of problem, guarding, multiple medical co morbidities, inability to manage pain with modalities due to active cancer, inability to mobilize joint due to cancer of bones    Goals:  (to be met by end of POC)   Pt will increase L shoulder flexion to 100 degrees, external rotation to base oc occiput, and internal rotation to L4/5 for independent dressing  Pt will " increase L upper extremity to 3+/5 to allow patient to perform activities of daily living independently  Pt will report decrease in pain to 4/10 to improve quality of life  Pt will place 3 pound object in overhead shelf without hike and with less than or equal to 4/10 pain to allow patient to return to prior level of function      PLAN     Plan of care Certification: 9/20/2022 to 11/1/2022.     Outpatient Physical Therapy 2 times weekly for 2 weeks to include the following interventions: Manual Therapy, Moist Heat/ Ice, Patient Education, Therapeutic Activities, and Therapeutic Exercise.     Continue POC Per PT order to progress patient toward rehab goals as tolerated by patient.  Plan to recert per PT order.    YOHANNES Reyes  11/1/2022

## 2022-11-03 ENCOUNTER — CLINICAL SUPPORT (OUTPATIENT)
Dept: REHABILITATION | Facility: HOSPITAL | Age: 67
End: 2022-11-03
Payer: MEDICARE

## 2022-11-03 DIAGNOSIS — M25.511 ACUTE PAIN OF RIGHT SHOULDER: Primary | ICD-10-CM

## 2022-11-03 PROCEDURE — 97110 THERAPEUTIC EXERCISES: CPT

## 2022-11-03 PROCEDURE — 97530 THERAPEUTIC ACTIVITIES: CPT

## 2022-11-03 NOTE — PROGRESS NOTES
"[facility]   Physical Therapy Treatment Note     Name: Ciera Johnson  Clinic Number: [id number]    Therapy Diagnosis:   Encounter Diagnosis   Name Primary?    Acute pain of right shoulder Yes     Physician: Temo Valdovinos MD    Visit Date: 11/3/2022    Physician Orders: PT Eval and Treat   Medical Diagnosis from Referral: R humerus fracture   Evaluation Date: 9/20/2022  Authorization Period Expiration: 9/20/2023  Plan of Care Expiration: 11/11/2022  Visit # / Visits authorized: 12/16     Precautions: cancer and diabetic NO MODALITIES     PTA Visit #: 0/5    Time In: 8:00  Time Out: 9:00   Total Billable Time:  52 minutes billable and 8 minutes not billable for ice     SUBJECTIVE     Pt reports:  "My shoulder is feeling better, but I am hurting where my cancer is."     She was compliant with home exercise program.  Response to previous treatment: Post treatment soreness   Functional change: improving ROM for ADL's    Pain: 0/10  Location: right shoulder     OBJECTIVE     Objective Measures updated at progress report unless specified.     Treatment     Ciera received the treatments listed below:      THERAPEUTIC EXERCISES to develop strength, endurance, ROM, flexibility, and posture.  See flow-sheet below:    Ther-Ex Reps    UBE 3/3   Pulleys (flexion and scaption)  5 minutes each    Ball Rolls (flexion and abduction)  10 x 10" each    Hand Gripper  30 x 3" Red    Finger Ladder 5 x 10"   Scap retractions with Thera Cord  2 x 10, Red Thera Band    IR/ER with Thera Cord  2 x 10, Red Thera Band    Shoulder Extension with Thera Cord  2 x 10, Red *    Shoulder Flexion with Thera Cord  10 x Red    Pendulums flex/ext.  30 x    Pendulums cw/ccw  30 x    I,Y,T 15 x each    Wall Slides  5 x 10"   Cane ER stretch  10 x 10"    Cane flexion stretch 10 x 10"    Cane ABD stretch  10 x 10 "    IR stretch with sheet  5 x 10" * (not today)    Cane Scapular elevation  10 x 5" (behind back) (not today)     "   CP x 8 minutes to Right shoulder     Patient Education and Home Exercises     Home Exercises Provided and Patient Education Provided     Education provided: POC, DOMS, HEP;  Added wall slides to HEP;  Added IR stretch with towel.     Written Home Exercises Provided: yes. Exercises were reviewed and Ciera was able to demonstrate them prior to the end of the session.  Ciera demonstrated fair  understanding of the education provided. See EMR under Patient Instructions for exercises provided during therapy sessions    ASSESSMENT     Ciera is a 67 y.o. female referred to outpatient Physical Therapy with a medical diagnosis of R humerus fracture. Pt presents with R shoulder pain, decreased R shoulder ROM, and decreased R UE strength.  PT did not make increases to treatment tasks due to recent increases at last treatment session. PT provided patient with visual and verbal cueing for proper form and posture with exercises. Patient had no reports of increased pain or adverse effects to treatment.     Ciera Is progressing well towards her goals.   Pt prognosis is Good.     Pt will continue to benefit from skilled outpatient physical therapy to address the deficits listed in the problem list box on initial evaluation, provide pt/family education and to maximize pt's level of independence in the home and community environment.     Pt's spiritual, cultural and educational needs considered and pt agreeable to plan of care and goals.     Anticipated barriers to physical therapy:  chronicity of problem, guarding, multiple medical co morbidities, inability to manage pain with modalities due to active cancer, inability to mobilize joint due to cancer of bones    Goals:  (to be met by end of POC)   Pt will increase L shoulder flexion to 100 degrees, external rotation to base oc occiput, and internal rotation to L4/5 for independent dressing  Pt will increase L upper extremity to 3+/5 to allow patient to perform activities of daily  living independently  Pt will report decrease in pain to 4/10 to improve quality of life  Pt will place 3 pound object in overhead shelf without hike and with less than or equal to 4/10 pain to allow patient to return to prior level of function      PLAN     Plan of care Certification: 9/20/2022 to 11/1/2022.     Outpatient Physical Therapy 2 times weekly for 2 weeks to include the following interventions: Manual Therapy, Moist Heat/ Ice, Patient Education, Therapeutic Activities, and Therapeutic Exercise.     Continue POC Per PT order to progress patient toward rehab goals as tolerated by patient.  Plan to recert per PT order.    Javi Muñoz, PT, DPT   11/3/2022

## 2022-11-09 ENCOUNTER — DOCUMENTATION ONLY (OUTPATIENT)
Dept: REHABILITATION | Facility: HOSPITAL | Age: 67
End: 2022-11-09
Payer: MEDICARE

## 2022-11-09 NOTE — PROGRESS NOTES
Outpatient Therapy Discharge Summary     Name: Ciera Johnson  Clinic Number: [id number]     Therapy Diagnosis:        Encounter Diagnosis   Name Primary?    Acute pain of right shoulder Yes      Physician: Temo Valdovinos MD     Visit Date: 11/3/2022     Physician Orders: PT Eval and Treat   Medical Diagnosis from Referral: R humerus fracture   Evaluation Date: 9/20/2022  Date of Last visit: 11/3/2022  Total Visits Received: 12  Cancelled Visits: 3  No Show Visits: 3    Assessment    Goals:  Pt will increase L shoulder flexion to 100 degrees, external rotation to base oc occiput, and internal rotation to L4/5 for independent dressing- unable to assess due to patient not returning to PT   Pt will increase L upper extremity to 3+/5 to allow patient to perform activities of daily living independently- MET   Pt will report decrease in pain to 4/10 to improve quality of life-MET   Pt will place 3 pound object in overhead shelf without hike and with less than or equal to 4/10 pain to allow patient to return to prior level of function - unable to assess due to patient not returning to PT     Discharge reason: Other:  Patient missed her last two scheduled visits. Patient had reached max allowed visits by her insurance except for the last two scheduled visits.     Plan   This patient is discharged from Physical Therapy.     Javi Muñoz, PT, DPT